# Patient Record
Sex: FEMALE | Race: BLACK OR AFRICAN AMERICAN | Employment: FULL TIME | URBAN - METROPOLITAN AREA
[De-identification: names, ages, dates, MRNs, and addresses within clinical notes are randomized per-mention and may not be internally consistent; named-entity substitution may affect disease eponyms.]

---

## 2017-02-11 ENCOUNTER — HOSPITAL ENCOUNTER (EMERGENCY)
Age: 32
Discharge: HOME | End: 2017-02-11
Attending: EMERGENCY MEDICINE
Payer: COMMERCIAL

## 2017-02-11 ENCOUNTER — APPOINTMENT (OUTPATIENT)
Dept: GENERAL RADIOLOGY | Age: 32
End: 2017-02-11
Attending: PHYSICIAN ASSISTANT
Payer: COMMERCIAL

## 2017-02-11 VITALS
BODY MASS INDEX: 47.18 KG/M2 | TEMPERATURE: 98.6 F | OXYGEN SATURATION: 100 % | DIASTOLIC BLOOD PRESSURE: 84 MMHG | HEART RATE: 87 BPM | SYSTOLIC BLOOD PRESSURE: 167 MMHG | WEIGHT: 283.51 LBS | RESPIRATION RATE: 18 BRPM
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
## 2017-02-11 DIAGNOSIS — M54.2 NECK PAIN: ICD-10-CM

## 2017-02-11 PROCEDURE — 72050 X-RAY EXAM NECK SPINE 4/5VWS: CPT

## 2017-02-11 PROCEDURE — 99282 EMERGENCY DEPT VISIT SF MDM: CPT

## 2017-02-11 PROCEDURE — 74011250637 HC RX REV CODE- 250/637: Performed by: PHYSICIAN ASSISTANT

## 2017-02-11 RX ORDER — DIAZEPAM 5 MG/1
5 TABLET ORAL
Status: COMPLETED | OUTPATIENT
Start: 2017-02-11 | End: 2017-02-11

## 2017-02-11 RX ORDER — OXYCODONE AND ACETAMINOPHEN 5; 325 MG/1; MG/1
1 TABLET ORAL
Status: COMPLETED | OUTPATIENT
Start: 2017-02-11 | End: 2017-02-11

## 2017-02-11 RX ORDER — OXYCODONE AND ACETAMINOPHEN 5; 325 MG/1; MG/1
1 TABLET ORAL
Qty: 10 TAB | Refills: 0 | Status: SHIPPED | OUTPATIENT
Start: 2017-02-11 | End: 2017-06-26

## 2017-02-11 RX ORDER — DIAZEPAM 5 MG/1
5 TABLET ORAL
Qty: 10 TAB | Refills: 0 | Status: SHIPPED | OUTPATIENT
Start: 2017-02-11 | End: 2017-06-26

## 2017-02-11 RX ADMIN — DIAZEPAM 5 MG: 5 TABLET ORAL at 13:19

## 2017-02-11 RX ADMIN — OXYCODONE HYDROCHLORIDE AND ACETAMINOPHEN 1 TABLET: 5; 325 TABLET ORAL at 13:18

## 2017-02-11 NOTE — DISCHARGE INSTRUCTIONS
Neck Pain: Care Instructions  Your Care Instructions  You can have neck pain anywhere from the bottom of your head to the top of your shoulders. It can spread to the upper back or arms. Injuries, painting a ceiling, sleeping with your neck twisted, staying in one position for too long, and many other activities can cause neck pain. Most neck pain gets better with home care. Your doctor may recommend medicine to relieve pain or relax your muscles. He or she may suggest exercise and physical therapy to increase flexibility and relieve stress. You may need to wear a special (cervical) collar to support your neck for a day or two. Follow-up care is a key part of your treatment and safety. Be sure to make and go to all appointments, and call your doctor if you are having problems. It's also a good idea to know your test results and keep a list of the medicines you take. How can you care for yourself at home? · Try using a heating pad on a low or medium setting for 15 to 20 minutes every 2 or 3 hours. Try a warm shower in place of one session with the heating pad. · You can also try an ice pack for 10 to 15 minutes every 2 to 3 hours. Put a thin cloth between the ice and your skin. · Take pain medicines exactly as directed. ¨ If the doctor gave you a prescription medicine for pain, take it as prescribed. ¨ If you are not taking a prescription pain medicine, ask your doctor if you can take an over-the-counter medicine. · If your doctor recommends a cervical collar, wear it exactly as directed. When should you call for help? Call your doctor now or seek immediate medical care if:  · You have new or worsening numbness in your arms, buttocks or legs. · You have new or worsening weakness in your arms or legs. (This could make it hard to stand up.)  · You lose control of your bladder or bowels.   Watch closely for changes in your health, and be sure to contact your doctor if:  · Your neck pain is getting worse.  · You are not getting better after 1 week. · You do not get better as expected. Where can you learn more? Go to http://angela-jennie.info/. Enter [id number] in the search box to learn more about \"Neck Pain: Care Instructions. \"  Current as of: May 23, 2016  Content Version: 11.1  © 2052-7727 "[employer] Care instructions adapted under license by [employer] (which disclaims liability or warranty for this information). If you have questions about a medical condition or this instruction, always ask your healthcare professional. [place] [address] any warranty or liability for your use of this information.
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
## 2017-02-20 ENCOUNTER — TELEPHONE (OUTPATIENT)
Dept: INTERNAL MEDICINE CLINIC | Age: 32
End: 2017-02-20
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
## 2017-02-20 NOTE — TELEPHONE ENCOUNTER
Called, spoke to pt. Two pt identifiers confirmed. Pt offered and accepted appt for 2/21/17 1015. Pt verbalized understanding of information discussed w/ no further questions at this time.

## 2017-02-21 ENCOUNTER — OFFICE VISIT (OUTPATIENT)
Dept: INTERNAL MEDICINE CLINIC | Age: 32
End: 2017-02-21

## 2017-02-21 VITALS
SYSTOLIC BLOOD PRESSURE: 127 MMHG | HEIGHT: 65 IN | TEMPERATURE: 98.1 F | WEIGHT: 277.4 LBS | OXYGEN SATURATION: 100 % | BODY MASS INDEX: 46.22 KG/M2 | HEART RATE: 72 BPM | DIASTOLIC BLOOD PRESSURE: 80 MMHG

## 2017-02-21 DIAGNOSIS — E55.9 VITAMIN D DEFICIENCY: ICD-10-CM
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
## 2017-02-21 DIAGNOSIS — G89.21 CHRONIC PAIN DUE TO TRAUMA: ICD-10-CM

## 2017-02-21 DIAGNOSIS — E66.01 MORBID OBESITY WITH BMI OF 45.0-49.9, ADULT (HCC): Chronic | ICD-10-CM

## 2017-02-21 DIAGNOSIS — G89.29 CHRONIC BILATERAL THORACIC BACK PAIN: Primary | ICD-10-CM

## 2017-02-21 DIAGNOSIS — M54.6 PAIN IN THORACIC SPINE: Primary | ICD-10-CM

## 2017-02-21 DIAGNOSIS — R73.03 PREDIABETES: ICD-10-CM

## 2017-02-21 DIAGNOSIS — M54.6 CHRONIC BILATERAL THORACIC BACK PAIN: Primary | ICD-10-CM
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
## 2017-02-21 RX ORDER — CYCLOBENZAPRINE HCL 10 MG
10 TABLET ORAL
Qty: 30 TAB | Refills: 0 | Status: SHIPPED | OUTPATIENT
Start: 2017-02-21 | End: 2017-06-26

## 2017-02-21 RX ORDER — IBUPROFEN 800 MG/1
800 TABLET ORAL
Qty: 40 TAB | Refills: 0 | Status: SHIPPED | OUTPATIENT
Start: 2017-02-21
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
## 2017-02-22 ENCOUNTER — TELEPHONE (OUTPATIENT)
Dept: INTERNAL MEDICINE CLINIC | Age: 32
End: 2017-02-22

## 2017-02-22 LAB
ALBUMIN SERPL-MCNC: 4.6 G/DL (ref 3.5–5.5)
ALBUMIN/GLOB SERPL: 2.1 {RATIO} (ref 1.1–2.5)
ALP SERPL-CCNC: 69 IU/L (ref 39–117)
ALT SERPL-CCNC: 12 IU/L (ref 0–32)
AST SERPL-CCNC: 16 IU/L (ref 0–40)
BILIRUB SERPL-MCNC: 0.5 MG/DL (ref 0–1.2)
BUN SERPL-MCNC: 13 MG/DL (ref 6–20)
BUN/CREAT SERPL: 20 (ref 8–20)
CALCIUM SERPL-MCNC: 9.3 MG/DL (ref 8.7–10.2)
CHLORIDE SERPL-SCNC: 100 MMOL/L (ref 96–106)
CO2 SERPL-SCNC: 22 MMOL/L (ref 18–29)
CREAT SERPL-MCNC: 0.65 MG/DL (ref 0.57–1)
ERYTHROCYTE [DISTWIDTH] IN BLOOD BY AUTOMATED COUNT: 14.7 % (ref 12.3–15.4)
EST. AVERAGE GLUCOSE BLD GHB EST-MCNC: 123 MG/DL
GLOBULIN SER CALC-MCNC: 2.2 G/DL (ref 1.5–4.5)
GLUCOSE SERPL-MCNC: 93 MG/DL (ref 65–99)
HBA1C MFR BLD: 5.9 % (ref 4.8–5.6)
HCT VFR BLD AUTO: 39.6 % (ref 34–46.6)
HGB BLD-MCNC: 12.6 G/DL (ref 11.1–15.9)
MCH RBC QN AUTO: 28.1 PG (ref 26.6–33)
MCHC RBC AUTO-ENTMCNC: 31.8 G/DL (ref 31.5–35.7)
MCV RBC AUTO: 88 FL (ref 79–97)
PLATELET # BLD AUTO: 411 X10E3/UL (ref 150–379)
POTASSIUM SERPL-SCNC: 4.6 MMOL/L (ref 3.5–5.2)
PROT SERPL-MCNC: 6.8 G/DL (ref 6–8.5)
RBC # BLD AUTO: 4.49 X10E6/UL (ref 3.77–5.28)
SODIUM SERPL-SCNC: 138 MMOL/L (ref 134–144)
TSH SERPL DL<=0.005 MIU/L-ACNC: 0.9 UIU/ML (ref 0.45–4.5)
WBC # BLD AUTO: 9.6 X10E3/UL (ref 3.4–10.8)

## 2017-02-22 NOTE — TELEPHONE ENCOUNTER
Pt called and states that she is needing a call back in regards to if she is to get a medication over the counter or called in for her Vitamin D level being low. Please call pt to advise.

## 2017-02-23 NOTE — TELEPHONE ENCOUNTER
MD Jake Root LPN        Caller: Unspecified (Yesterday, 12:49 PM)                     Start with vitamin d 2000units per day

## 2017-02-23 NOTE — TELEPHONE ENCOUNTER
Called, spoke to pt. Two pt identifiers confirmed. Pt informed per Dr. Petty Gaming to start with otc vitamin d 2kU daily. Pt verbalized understanding of information discussed w/ no further questions at this time.

## 2017-02-27 ENCOUNTER — HOSPITAL ENCOUNTER (OUTPATIENT)
Dept: PHYSICAL THERAPY | Age: 32
Discharge: HOME | End: 2017-02-27
Payer: COMMERCIAL

## 2017-02-27 PROCEDURE — 97535 SELF CARE MNGMENT TRAINING: CPT | Performed by: PHYSICAL THERAPIST

## 2017-02-27 PROCEDURE — 97162 PT EVAL MOD COMPLEX 30 MIN: CPT | Performed by: PHYSICAL THERAPIST

## 2017-02-27 PROCEDURE — 97110 THERAPEUTIC EXERCISES: CPT | Performed by: PHYSICAL THERAPIST
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
## 2017-03-01 ENCOUNTER — APPOINTMENT (OUTPATIENT)
Dept: PHYSICAL THERAPY | Age: 32
End: 2017-03-01
Payer: COMMERCIAL

## 2017-03-06 ENCOUNTER — HOSPITAL ENCOUNTER (OUTPATIENT)
Dept: PHYSICAL THERAPY | Age: 32
Discharge: HOME | End: 2017-03-06
Payer: COMMERCIAL

## 2017-03-06 PROCEDURE — 97110 THERAPEUTIC EXERCISES: CPT | Performed by: PHYSICAL THERAPIST

## 2017-03-06 NOTE — PROGRESS NOTES
PT DAILY TREATMENT NOTE 2-15    Patient Name: Laith Wakefield  Date:3/6/2017  : 1985  [x]  Patient  Verified  Payor: BLUE CROSS / Plan: [address] [place] [address] / Product Type: PPO /    In time:11:13am  Out time:11:56am  Total Treatment Time (min): 43  Visit #: 2     Treatment Area: Pain in thoracic spine [M54.6]  Other chronic pain [G89.29]    SUBJECTIVE  Pain Level (0-10 scale): 5  Any medication changes, allergies to medications, adverse drug reactions, diagnosis change, or new procedure performed?: [x] No    [] Yes (see summary sheet for update)  Subjective functional status/changes:   [] No changes reported  The patient reports that she's doing okay, she can't sleep without the flexeril. OBJECTIVE    40 min Therapeutic Exercise:  [x] See flow sheet :   Rationale: increase ROM, increase strength and improve coordination to improve the patients ability to perform daily activities. With   [x] TE   [] TA   [] neuro   [] other: Patient Education: [x] Review HEP    [x] Progressed/Changed HEP based on:   [x] positioning   [x] body mechanics   [] transfers   [] heat/ice application    [] other:      Other Objective/Functional Measures: Pt. Had minimal increase in discomfort with therex today. Pain Level (0-10 scale) post treatment: 6.5    ASSESSMENT/Changes in Function:     The patient progressed with tolerance to therex and ROM today. Patient will continue to benefit from skilled PT services to modify and progress therapeutic interventions, address functional mobility deficits, address ROM deficits, address strength deficits, analyze and address soft tissue restrictions, analyze and cue movement patterns, analyze and modify body mechanics/ergonomics, assess and modify postural abnormalities, address imbalance/dizziness and instruct in home and community integration to attain remaining goals.      [x]  See Plan of Care  []  See progress note/recertification  []  See Discharge Summary Progress towards goals / Updated goals: The patient is progressing towards goals.     PLAN  [x]  Upgrade activities as tolerated     [x]  Continue plan of care  [x]  Update interventions per flow sheet       []  Discharge due to:_  []  Other:_      Mario Bautista PT 3/6/2017  11:24 AM

## 2017-03-08 ENCOUNTER — APPOINTMENT (OUTPATIENT)
Dept: PHYSICAL THERAPY | Age: 32
End: 2017-03-08
Payer: COMMERCIAL

## 2017-03-09 ENCOUNTER — APPOINTMENT (OUTPATIENT)
Dept: PHYSICAL THERAPY | Age: 32
End: 2017-03-09
Payer: COMMERCIAL

## 2017-03-10 ENCOUNTER — HOSPITAL ENCOUNTER (OUTPATIENT)
Dept: PHYSICAL THERAPY | Age: 32
Discharge: HOME | End: 2017-03-10
Payer: COMMERCIAL

## 2017-03-10 PROCEDURE — 97110 THERAPEUTIC EXERCISES: CPT | Performed by: PHYSICAL THERAPIST

## 2017-03-10 PROCEDURE — 97014 ELECTRIC STIMULATION THERAPY: CPT | Performed by: PHYSICAL THERAPIST

## 2017-03-10 NOTE — PROGRESS NOTES
PT DAILY TREATMENT NOTE 2-15    Patient Name: Paola Valentine  Date:3/10/2017  : 1985  [x]  Patient  Verified  Payor: [employer] / Plan: [address] [place] [address] / Product Type: PPO /    In time:8:09am  Out time:9:28am  Total Treatment Time (min): 79  Visit #: 3     Treatment Area: Pain in thoracic spine [M54.6]  Other chronic pain [G89.29]    SUBJECTIVE  Pain Level (0-10 scale): 8  Any medication changes, allergies to medications, adverse drug reactions, diagnosis change, or new procedure performed?: [x] No    [] Yes (see summary sheet for update)  Subjective functional status/changes:   [] No changes reported  The patient reports that she couldn't sleep last night due to back pain and is hurting this morning. OBJECTIVE    Modality rationale: decrease edema, decrease inflammation, decrease pain and increase tissue extensibility to improve the patients ability to perform daily activities.    Min Type Additional Details   15 [x] Estim: []Att   [x]Unatt        []TENS instruct                  [x]IFC  []Premod   []NMES                     []Other:  []w/US   []w/ice   [x]w/heat  Position: supine  Location: mid thoracic    []  Traction: [] Cervical       []Lumbar                       [] Prone          []Supine                       []Intermittent   []Continuous Lbs:  [] before manual  [] after manual  []w/heat    []  Ultrasound: []Continuous   [] Pulsed at:                            []1MHz   []3MHz Location:  W/cm2:    []  Paraffin         Location:  []w/heat    []  Ice     []  Heat  []  Ice massage Position:  Location:    []  Laser  []  Other: Position:  Location:    []  Vasopneumatic Device Pressure:       [] lo [] med [] hi   Temperature:    [x] Skin assessment post-treatment:  [x]intact []redness- no adverse reaction    []redness  adverse reaction:     55 min Therapeutic Exercise:  [x] See flow sheet :   Rationale: increase ROM, increase strength and improve coordination to improve the patients ability to perform daily activities. With   [x] TE   [] TA   [] neuro   [] other: Patient Education: [x] Review HEP    [x] Progressed/Changed HEP based on:   [x] positioning   [x] body mechanics   [] transfers   [] heat/ice application    [] other:      Other Objective/Functional Measures: Pt. Tolerated therex well     Pain Level (0-10 scale) post treatment: 6    ASSESSMENT/Changes in Function:     The patient progressed with strengthening exercises to tolerance today. Patient will continue to benefit from skilled PT services to modify and progress therapeutic interventions, address functional mobility deficits, address ROM deficits, address strength deficits, analyze and address soft tissue restrictions, analyze and cue movement patterns, analyze and modify body mechanics/ergonomics, assess and modify postural abnormalities, address imbalance/dizziness and instruct in home and community integration to attain remaining goals. [x]  See Plan of Care  []  See progress note/recertification  []  See Discharge Summary         Progress towards goals / Updated goals: The patient is progressing towards goals.     PLAN  [x]  Upgrade activities as tolerated     [x]  Continue plan of care  [x]  Update interventions per flow sheet       []  Discharge due to:_  []  Other:_      Don Menezes, PT 3/10/2017  8:26 AM

## 2017-03-13 ENCOUNTER — HOSPITAL ENCOUNTER (OUTPATIENT)
Dept: PHYSICAL THERAPY | Age: 32
Discharge: HOME | End: 2017-03-13
Payer: COMMERCIAL

## 2017-03-13 PROCEDURE — 97014 ELECTRIC STIMULATION THERAPY: CPT | Performed by: PHYSICAL THERAPIST

## 2017-03-13 PROCEDURE — 97110 THERAPEUTIC EXERCISES: CPT | Performed by: PHYSICAL THERAPIST

## 2017-03-13 NOTE — PROGRESS NOTES
PT DAILY TREATMENT NOTE 2-15    Patient Name: Lilia Harper  Date:3/13/2017  : 1985  [x]  Patient  Verified  Payor: [employer] / Plan: [address] [place] [address] / Product Type: PPO /    In time:10:10am  Out time:10:55am  Total Treatment Time (min): 45  Visit #: 4     Treatment Area: Pain in thoracic spine [M54.6]  Other chronic pain [G89.29]    SUBJECTIVE  Pain Level (0-10 scale): 6  Any medication changes, allergies to medications, adverse drug reactions, diagnosis change, or new procedure performed?: [x] No    [] Yes (see summary sheet for update)  Subjective functional status/changes:   [] No changes reported  The patient reports that she felt much better after last time and thinks the ESTIM helped along with the new exercises. OBJECTIVE    Modality rationale: decrease edema, decrease inflammation, decrease pain and increase tissue extensibility to improve the patients ability to perform daily activities.    Min Type Additional Details   15 [x] Estim: []Att   [x]Unatt        []TENS instruct                  [x]IFC  []Premod   []NMES                     []Other:  []w/US   []w/ice   [x]w/heat  Position: supine  Location:  Mid thoracic    []  Traction: [] Cervical       []Lumbar                       [] Prone          []Supine                       []Intermittent   []Continuous Lbs:  [] before manual  [] after manual  []w/heat    []  Ultrasound: []Continuous   [] Pulsed at:                            []1MHz   []3MHz Location:  W/cm2:    []  Paraffin         Location:  []w/heat    []  Ice     []  Heat  []  Ice massage Position:  Location:    []  Laser  []  Other: Position:  Location:    []  Vasopneumatic Device Pressure:       [] lo [] med [] hi   Temperature:    [x] Skin assessment post-treatment:  [x]intact []redness- no adverse reaction    []redness  adverse reaction:     30 min Therapeutic Exercise:  [x] See flow sheet :   Rationale: increase ROM, increase strength and improve coordination to improve the patients ability to perform daily activities. With   [x] TE   [] TA   [] neuro   [] other: Patient Education: [x] Review HEP    [x] Progressed/Changed HEP based on:   [x] positioning   [x] body mechanics   [] transfers   [] heat/ice application    [] other:      Other Objective/Functional Measures: Pt. Tolerated new therex well     Pain Level (0-10 scale) post treatment: 4    ASSESSMENT/Changes in Function:     The patient progressed with new thoracic and core strengthening exercises. Patient will continue to benefit from skilled PT services to modify and progress therapeutic interventions, address functional mobility deficits, address ROM deficits, address strength deficits, analyze and address soft tissue restrictions, analyze and cue movement patterns, analyze and modify body mechanics/ergonomics, assess and modify postural abnormalities and instruct in home and community integration to attain remaining goals. [x]  See Plan of Care  []  See progress note/recertification  []  See Discharge Summary         Progress towards goals / Updated goals:   The patient is progressing towards goals    PLAN  [x]  Upgrade activities as tolerated     [x]  Continue plan of care  [x]  Update interventions per flow sheet       []  Discharge due to:_  []  Other:_      Kristie Birmingham, PT 3/13/2017  10:16 AM

## 2017-03-15 ENCOUNTER — APPOINTMENT (OUTPATIENT)
Dept: PHYSICAL THERAPY | Age: 32
End: 2017-03-15
Payer: COMMERCIAL

## 2017-03-16 ENCOUNTER — HOSPITAL ENCOUNTER (OUTPATIENT)
Dept: PHYSICAL THERAPY | Age: 32
Discharge: HOME | End: 2017-03-16
Payer: COMMERCIAL

## 2017-03-16 PROCEDURE — 97110 THERAPEUTIC EXERCISES: CPT | Performed by: PHYSICAL THERAPIST

## 2017-03-16 PROCEDURE — 97014 ELECTRIC STIMULATION THERAPY: CPT | Performed by: PHYSICAL THERAPIST

## 2017-03-16 NOTE — PROGRESS NOTES
PT DAILY TREATMENT NOTE 2-15    Patient Name: Laith Wakefield  Date:3/16/2017  : 1985  [x]  Patient  Verified  Payor: BLUE CROSS / Plan: [address] [place] [address] / Product Type: PPO /    In time:12:55pm  Out time:1:45pm  Total Treatment Time (min): 50  Visit #: 5     Treatment Area: Pain in thoracic spine [M54.6]  Other chronic pain [G89.29]    SUBJECTIVE  Pain Level (0-10 scale): 4  Any medication changes, allergies to medications, adverse drug reactions, diagnosis change, or new procedure performed?: [x] No    [] Yes (see summary sheet for update)  Subjective functional status/changes:   [] No changes reported  The patient reports that she thinks the exercises are helping, her pain is now a little higher towards the neck than middle back. OBJECTIVE    Modality rationale: decrease edema, decrease inflammation, decrease pain and increase tissue extensibility to improve the patients ability to perform daily activities.    Min Type Additional Details   15 [x] Estim: []Att   [x]Unatt        []TENS instruct                  [x]IFC  []Premod   []NMES                     []Other:  []w/US   []w/ice   [x]w/heat  Position: supine  Location: cervicothoracic    []  Traction: [] Cervical       []Lumbar                       [] Prone          []Supine                       []Intermittent   []Continuous Lbs:  [] before manual  [] after manual  []w/heat    []  Ultrasound: []Continuous   [] Pulsed at:                            []1MHz   []3MHz Location:  W/cm2:    []  Paraffin         Location:  []w/heat    []  Ice     []  Heat  []  Ice massage Position:  Location:    []  Laser  []  Other: Position:  Location:    []  Vasopneumatic Device Pressure:       [] lo [] med [] hi   Temperature:    [x] Skin assessment post-treatment:  [x]intact []redness- no adverse reaction    []redness  adverse reaction:     30 min Therapeutic Exercise:  [x] See flow sheet :   Rationale: increase ROM, increase strength and improve coordination to improve the patients ability to perform daily activities. With   [x] TE   [] TA   [] neuro   [] other: Patient Education: [x] Review HEP    [x] Progressed/Changed HEP based on:   [x] positioning   [x] body mechanics   [] transfers   [] heat/ice application    [] other:      Other Objective/Functional Measures: Pt. Had minimal to no increase in pain today. Pain Level (0-10 scale) post treatment: 4    ASSESSMENT/Changes in Function:     The patient progressed with tolerance to exercises and improved strength. Patient will continue to benefit from skilled PT services to modify and progress therapeutic interventions, address functional mobility deficits, address ROM deficits, address strength deficits, analyze and address soft tissue restrictions, analyze and cue movement patterns, analyze and modify body mechanics/ergonomics, assess and modify postural abnormalities, address imbalance/dizziness and instruct in home and community integration to attain remaining goals. [x]  See Plan of Care  []  See progress note/recertification  []  See Discharge Summary         Progress towards goals / Updated goals: The patient is progressing towards goals.     PLAN  [x]  Upgrade activities as tolerated     [x]  Continue plan of care  [x]  Update interventions per flow sheet       []  Discharge due to:_  []  Other:_      Darrick Negro, PT 3/16/2017  1:04 PM

## 2017-03-20 ENCOUNTER — APPOINTMENT (OUTPATIENT)
Dept: PHYSICAL THERAPY | Age: 32
End: 2017-03-20
Payer: COMMERCIAL

## 2017-03-21 ENCOUNTER — HOSPITAL ENCOUNTER (OUTPATIENT)
Dept: PHYSICAL THERAPY | Age: 32
Discharge: HOME | End: 2017-03-21
Payer: COMMERCIAL

## 2017-03-21 PROCEDURE — 97014 ELECTRIC STIMULATION THERAPY: CPT | Performed by: PHYSICAL THERAPIST

## 2017-03-21 PROCEDURE — 97110 THERAPEUTIC EXERCISES: CPT | Performed by: PHYSICAL THERAPIST

## 2017-03-21 NOTE — PROGRESS NOTES
PT DAILY TREATMENT NOTE 2-15    Patient Name: Katie Greene  Date:3/21/2017  : 1985  [x]  Patient  Verified  Payor: [employer] / Plan: [address] [place] [address] / Product Type: PPO /    In time:7:17am  Out time:8:20am  Total Treatment Time (min): 63  Visit #: 6    Treatment Area: Pain in thoracic spine [M54.6]  Other chronic pain [G89.29]    SUBJECTIVE  Pain Level (0-10 scale): 7  Any medication changes, allergies to medications, adverse drug reactions, diagnosis change, or new procedure performed?: [x] No    [] Yes (see summary sheet for update)  Subjective functional status/changes:   [] No changes reported  Ayush stated that her neck and back were bothering her last night and had trouble getting comfortable enough to fall asleep with several periods of waking up in the middle of the night.     OBJECTIVE    Modality rationale: decrease inflammation, decrease pain and increase tissue extensibility to improve the patients ability to perform exercises for therapy today   Min Type Additional Details   15 [x] Estim: []Att   [x]Unatt        []TENS instruct                  [x]IFC  []Premod   []NMES                     []Other:  []w/US   []w/ice   [x]w/heat  Position: supine  Location: upper thoracic spine and lower cervical region    []  Traction: [] Cervical       []Lumbar                       [] Prone          []Supine                       []Intermittent   []Continuous Lbs:  [] before manual  [] after manual  []w/heat    []  Ultrasound: []Continuous   [] Pulsed at:                            []1MHz   []3MHz Location:  W/cm2:    []  Paraffin         Location:  []w/heat    []  Ice     []  Heat  []  Ice massage Position:  Location:    []  Laser  []  Other: Position:  Location:    []  Vasopneumatic Device Pressure:       [] lo [] med [] hi   Temperature:    [x] Skin assessment post-treatment:  [x]intact []redness- no adverse reaction    []redness  adverse reaction:     40 min Therapeutic Exercise:  [x] See flow sheet :   Rationale: increase ROM, increase strength and improve coordination to improve the patients ability to perform daily activities. With   [x] TE   [] TA   [] neuro   [] other: Patient Education: [x] Review HEP    [x] Progressed/Changed HEP based on:   [x] positioning   [x] body mechanics   [] transfers   [] heat/ice application    [] other:          Pain Level (0-10 scale) post treatment: 5    ASSESSMENT/Changes in Function:   Patient tolerated more strengthening and range of motion activity today, she continues to complain of upper thoracic spine pain and difficulty with right upper trunk rotation exercise compared to the left. Patient will continue to benefit from skilled PT services to modify and progress therapeutic interventions, address functional mobility deficits, address ROM deficits, address strength deficits, analyze and address soft tissue restrictions, analyze and cue movement patterns, analyze and modify body mechanics/ergonomics, assess and modify postural abnormalities and instruct in home and community integration to attain remaining goals. [x]  See Plan of Care  []  See progress note/recertification  []  See Discharge Summary         Progress towards goals / Updated goals: The patient is progressing towards goals.     PLAN  [x]  Upgrade activities as tolerated     [x]  Continue plan of care  [x]  Update interventions per flow sheet       []  Discharge due to:_  []  Other:_      Nola Sharif PT 3/21/2017  7:28 AM

## 2017-03-22 ENCOUNTER — APPOINTMENT (OUTPATIENT)
Dept: PHYSICAL THERAPY | Age: 32
End: 2017-03-22
Payer: COMMERCIAL

## 2017-03-24 ENCOUNTER — HOSPITAL ENCOUNTER (OUTPATIENT)
Dept: PHYSICAL THERAPY | Age: 32
Discharge: HOME | End: 2017-03-24
Payer: COMMERCIAL

## 2017-03-24 PROCEDURE — 97014 ELECTRIC STIMULATION THERAPY: CPT | Performed by: PHYSICAL THERAPIST

## 2017-03-24 PROCEDURE — 97110 THERAPEUTIC EXERCISES: CPT | Performed by: PHYSICAL THERAPIST

## 2017-03-27 ENCOUNTER — APPOINTMENT (OUTPATIENT)
Dept: PHYSICAL THERAPY | Age: 32
End: 2017-03-27
Payer: COMMERCIAL

## 2017-03-29 ENCOUNTER — HOSPITAL ENCOUNTER (OUTPATIENT)
Dept: PHYSICAL THERAPY | Age: 32
Discharge: HOME | End: 2017-03-29
Payer: COMMERCIAL

## 2017-03-29 PROCEDURE — 97110 THERAPEUTIC EXERCISES: CPT | Performed by: PHYSICAL THERAPIST

## 2017-03-29 NOTE — PROGRESS NOTES
PT DAILY TREATMENT NOTE 2-15    Patient Name: Pramod Sep  Date:3/29/2017  : 1985  [x]  Patient  Verified  Payor: HARRISON KAYLEIGH / Plan: [address] [place] [address] / Product Type: PPO /    In time:12:05pm  Out time: 12:55pm  Total Treatment Time (min): 50  Visit #: 8     Treatment Area: Pain in thoracic spine [M54.6]  Other chronic pain [G89.29]    SUBJECTIVE  Pain Level (0-10 scale): 4  Any medication changes, allergies to medications, adverse drug reactions, diagnosis change, or new procedure performed?: [x] No    [] Yes (see summary sheet for update)  Subjective functional status/changes:   [] No changes reported  PAtient stated she's been feeling good today and since last session. OBJECTIVE      50 min Therapeutic Exercise:  [x] See alow sheet :   Rationale: increase ROM to improve the patients ability to improve ability to perform overhead work and household tasks. With   [x] TE   [] TA   [] neuro   [] other: Patient Education: [x] Review HEP    [x] Progressed/Changed HEP based on:   [x] positioning   [x] body mechanics   [] transfers   [] heat/ice application    [] other:      Pain Level (0-10 scale) post treatment: 5    ASSESSMENT/Changes in Function:   Patient progressing with more resistance and decreased pain during exercise today. She opted out of using the e-stim modality because she was less irritated at the end of the session. She also showed improved lumbar and cervical mobility during cable column rotation exercises. She still has increased pain at end of session however.   Patient will continue to benefit from skilled PT services to modify and progress therapeutic interventions, address functional mobility deficits, address ROM deficits, address strength deficits, analyze and address soft tissue restrictions, analyze and cue movement patterns, analyze and modify body mechanics/ergonomics, assess and modify postural abnormalities and instruct in home and community integration to attain remaining goals.      [x]  See Plan of Care  [x]  See progress note/recertification  []  See Discharge Summary           PLAN  [x]  Upgrade activities as tolerated     [x]  Continue plan of care  [x]  Update interventions per flow sheet       []  Discharge due to:_  []  Other:_      Ave Padilla, PT 3/29/2017  12:37 PM

## 2017-03-31 ENCOUNTER — APPOINTMENT (OUTPATIENT)
Dept: PHYSICAL THERAPY | Age: 32
End: 2017-03-31
Payer: COMMERCIAL

## 2017-04-04 ENCOUNTER — APPOINTMENT (OUTPATIENT)
Dept: PHYSICAL THERAPY | Age: 32
End: 2017-04-04
Payer: COMMERCIAL

## 2017-04-06 ENCOUNTER — HOSPITAL ENCOUNTER (OUTPATIENT)
Dept: PHYSICAL THERAPY | Age: 32
Discharge: HOME | End: 2017-04-06
Payer: COMMERCIAL

## 2017-04-06 PROCEDURE — 97110 THERAPEUTIC EXERCISES: CPT | Performed by: PHYSICAL THERAPIST

## 2017-04-11 ENCOUNTER — HOSPITAL ENCOUNTER (OUTPATIENT)
Dept: PHYSICAL THERAPY | Age: 32
Discharge: HOME | End: 2017-04-11
Payer: COMMERCIAL

## 2017-04-11 PROCEDURE — 97110 THERAPEUTIC EXERCISES: CPT | Performed by: PHYSICAL THERAPIST

## 2017-04-11 NOTE — PROGRESS NOTES
PT DAILY TREATMENT NOTE 2-15    Patient Name: Pramod Sep  Date:2017  : 1985  [x]  Patient  Verified  Payor: BLUE CROSS / Plan: [address] [place] [address] / Product Type: PPO /    In time: 7:20am  Out time:8:10am  Total Treatment Time (min): 50  Visit #: 10     Treatment Area: Pain in thoracic spine [M54.6]  Other chronic pain [G89.29]    SUBJECTIVE  Pain Level (0-10 scale): 5  Any medication changes, allergies to medications, adverse drug reactions, diagnosis change, or new procedure performed?: [x] No    [] Yes (see summary sheet for update)  Subjective functional status/changes:   [] No changes reported  The patient reports that her grandmother passed yesterday so she hasn't been doing too well overall. Her upper back/low neck pain usually gets bad mid-day and worse as the day goes on, and she describes it as tingling. OBJECTIVE    50 min Therapeutic Exercise:  [x] See flow sheet :   Rationale: increase ROM, increase strength and improve coordination to improve the patients ability to perform daily activities. With   [x] TE   [] TA   [] neuro   [] other: Patient Education: [x] Review HEP    [x] Progressed/Changed HEP based on:   [x] positioning   [x] body mechanics   [] transfers   [] heat/ice application    [] other:      Other Objective/Functional Measures:  FOTO= 59 (47 at eval); Cervical AROM: Rotation: R/L= 55, p! Bilaterally (45, p! Bilaterally at eval); Ext= 50 (20, p! At eval); Flex= 40 (35, p! At eval)     Pain Level (0-10 scale) post treatment: 5    ASSESSMENT/Changes in Function:     The patient is progressing with improved ROM and tolerance to therex.  Patient will continue to benefit from skilled PT services to modify and progress therapeutic interventions, address functional mobility deficits, address ROM deficits, address strength deficits, analyze and address soft tissue restrictions, analyze and cue movement patterns, analyze and modify body mechanics/ergonomics, assess and modify postural abnormalities and instruct in home and community integration to attain remaining goals. [x]  See Plan of Care  [x]  See progress note/recertification  []  See Discharge Summary         Progress towards goals / Updated goals: The patient is progressing towards goals.     PLAN  [x]  Upgrade activities as tolerated     [x]  Continue plan of care  [x]  Update interventions per flow sheet       []  Discharge due to:_  []  Other:_      David Kc, PT 4/11/2017  7:20 AM

## 2017-04-11 NOTE — PROGRESS NOTES
[facility]  [address] ([facility] [address] [facility] [address], [address]  Phone: [phone] Fax: [phone]    Progress Note    Name: Miriam Krabbe   : 1985   MD: Salvador Alejandra MD       Treatment Diagnosis: Pain in thoracic spine [M54.6]  Other chronic pain [G89.29]  Start of Care: 16    Visits from Start of Care: 10  Missed Visits: 7    Summary of Care: Therapy has included cervical, thoracic, and scapular strengthening and mobility therex, along with modalities for pain management. Assessment / Recommendations: The patient has progressed significantly with improved Cervical AROM: Rotation: R/L= 55, p! Bilaterally (45, p! Bilaterally at eval); Ext= 50 (20, p! At eval); Flex= 40 (35, p! At eval). She originally had significant mid thoracic pain, which has resolved, but her main discomfort is lower cervical (bilaterally), that she describes as occasional tingling. She is very tight in her upper traps bilaterally and is tender to palpate medial scapular borders. Her pain continues to seem muscular versus discogenic in nature and will continue to benefit from therapy to improve tissue extensibility, cervical, and thoracic mobility. Short Term Goals: To be accomplished in 2 treatments:  1.) The patient will be independent with her HEP.- MET  Long Term Goals: To be accomplished in 16 treatments:  1.) The patient will be able to sleep an entire night without waking up from pain without meds. - Progressing  2.) The patient will improve her cervical AROM rotation to at least 50 degrees, pain free, to assist with driving and daily activities. - Progressing (55 degrees but with pain; improved from 45 degrees bilaterally)  3.) The patient will have decreased thoracic pain to at most 2/10 pain to tolerance daily activities such as driving.- MET     Other: Continue 2x/wk  for 8 more treatments      She Johnson, PT 2017 8:05 AM    ________________________________________________________________________  NOTE TO PHYSICIAN:  Please complete the following and fax to: [facility]: [phone]  . Retain this original for your records. If you are unable to process this request in 24 hours, please contact our office.        ____ I have read the above report and request that my patient continue therapy with the following changes/special instructions:  ____ I have read the above report and request that my patient be discharged from therapy    Physician's Signature:_________________ Date:___________Time:__________

## 2017-04-13 ENCOUNTER — APPOINTMENT (OUTPATIENT)
Dept: PHYSICAL THERAPY | Age: 32
End: 2017-04-13
Payer: COMMERCIAL

## 2017-04-20 ENCOUNTER — APPOINTMENT (OUTPATIENT)
Dept: PHYSICAL THERAPY | Age: 32
End: 2017-04-20
Payer: COMMERCIAL

## 2017-04-21 ENCOUNTER — APPOINTMENT (OUTPATIENT)
Dept: PHYSICAL THERAPY | Age: 32
End: 2017-04-21
Payer: COMMERCIAL

## 2017-04-25 ENCOUNTER — APPOINTMENT (OUTPATIENT)
Dept: PHYSICAL THERAPY | Age: 32
End: 2017-04-25
Payer: COMMERCIAL

## 2017-04-27 ENCOUNTER — HOSPITAL ENCOUNTER (OUTPATIENT)
Dept: PHYSICAL THERAPY | Age: 32
Discharge: HOME | End: 2017-04-27
Payer: COMMERCIAL

## 2017-04-27 ENCOUNTER — APPOINTMENT (OUTPATIENT)
Dept: PHYSICAL THERAPY | Age: 32
End: 2017-04-27
Payer: COMMERCIAL

## 2017-04-27 PROCEDURE — 97110 THERAPEUTIC EXERCISES: CPT | Performed by: PHYSICAL THERAPIST

## 2017-04-27 NOTE — PROGRESS NOTES
PT DAILY TREATMENT NOTE 2-15    Patient Name: Miriam Krabbe  Date:2017  : 1985  [x]  Patient  Verified  Payor: BLUE CROSS / Plan: [address] [place] [address] / Product Type: PPO /    In time:7:00am  Out time:7:59  Total Treatment Time (min): 61  Visit #: 11     Treatment Area: Pain in thoracic spine [M54.6]  Other chronic pain [G89.29]    SUBJECTIVE  Pain Level (0-10 scale): 5  Any medication changes, allergies to medications, adverse drug reactions, diagnosis change, or new procedure performed?: [x] No    [] Yes (see summary sheet for update)  Subjective functional status/changes:   [] No changes reported  The patient reports she had two family members pass away over the past few weeks and it's been stressful, but she's been concentrating on her posture and it's been better. OBJECTIVE    55 min Therapeutic Exercise:  [x] See flow sheet :   Rationale: increase ROM, increase strength and improve coordination to improve the patients ability to perform daily activities. With   [x] TE   [] TA   [] neuro   [] other: Patient Education: [x] Review HEP    [x] Progressed/Changed HEP based on:   [x] positioning   [x] body mechanics   [] transfers   [] heat/ice application    [] other:      Other Objective/Functional Measures: Pt. tolerated therex well. Pain Level (0-10 scale) post treatment: 6.5    ASSESSMENT/Changes in Function:     The patient is progressing with new mobilization and strengthening exercises. Patient will continue to benefit from skilled PT services to modify and progress therapeutic interventions, address functional mobility deficits, address ROM deficits, address strength deficits, analyze and address soft tissue restrictions, analyze and cue movement patterns, analyze and modify body mechanics/ergonomics, assess and modify postural abnormalities, address imbalance/dizziness and instruct in home and community integration to attain remaining goals.      [x]  See Plan of Care  []  See progress note/recertification  []  See Discharge Summary         Progress towards goals / Updated goals: The patient is progressing towards goals.     PLAN  [x]  Upgrade activities as tolerated     [x]  Continue plan of care  [x]  Update interventions per flow sheet       []  Discharge due to:_  []  Other:_      Miky Tee, PT 4/27/2017  7:13 AM

## 2017-05-02 ENCOUNTER — APPOINTMENT (OUTPATIENT)
Dept: PHYSICAL THERAPY | Age: 32
End: 2017-05-02
Payer: COMMERCIAL

## 2017-05-04 ENCOUNTER — HOSPITAL ENCOUNTER (OUTPATIENT)
Dept: PHYSICAL THERAPY | Age: 32
Discharge: HOME | End: 2017-05-04
Payer: COMMERCIAL

## 2017-05-04 PROCEDURE — 97110 THERAPEUTIC EXERCISES: CPT | Performed by: PHYSICAL THERAPIST

## 2017-05-04 NOTE — PROGRESS NOTES
PT DAILY TREATMENT NOTE 2-15    Patient Name: Miriam Krabbe  Date:2017  : 1985  [x]  Patient  Verified  Payor: HARRISON Artemas / Plan: [address] [place] [address] / Product Type: PPO /    In time:8:07am  Out time: 9:00am  Total Treatment Time (min): 53  Visit #: 12     Treatment Area: Pain in thoracic spine [M54.6]  Other chronic pain [G89.29]    SUBJECTIVE  Pain Level (0-10 scale): 5  Any medication changes, allergies to medications, adverse drug reactions, diagnosis change, or new procedure performed?: [x] No    [] Yes (see summary sheet for update)  Subjective functional status/changes:   [] No changes reported  The patient reports that she is tolerating driving more, her pain is more manageable, but is usually more stiff in the morning. OBJECTIVE    53 min Therapeutic Exercise:  [x] See flow sheet :   Rationale: increase ROM, increase strength and improve coordination to improve the patients ability to perform daily activities. With   [x] TE   [] TA   [] neuro   [] other: Patient Education: [x] Review HEP    [x] Progressed/Changed HEP based on:   [x] positioning   [x] body mechanics   [] transfers   [] heat/ice application    [] other:      Other Objective/Functional Measures: Pt. Had no increase in pain with therex     Pain Level (0-10 scale) post treatment: 5    ASSESSMENT/Changes in Function:     The patient progressed with improved strengthening and mobility today. Patient will continue to benefit from skilled PT services to modify and progress therapeutic interventions, address functional mobility deficits, address ROM deficits, address strength deficits, analyze and address soft tissue restrictions, analyze and cue movement patterns, analyze and modify body mechanics/ergonomics, assess and modify postural abnormalities, address imbalance/dizziness and instruct in home and community integration to attain remaining goals.      [x]  See Plan of Care  []  See progress note/recertification  []  See Discharge Summary         Progress towards goals / Updated goals: The patient is progressing towards goals.     PLAN  [x]  Upgrade activities as tolerated     [x]  Continue plan of care  [x]  Update interventions per flow sheet       []  Discharge due to:_  []  Other:_      Meagan Loaiza, PT 5/4/2017  8:14 AM

## 2017-05-09 ENCOUNTER — APPOINTMENT (OUTPATIENT)
Dept: PHYSICAL THERAPY | Age: 32
End: 2017-05-09
Payer: COMMERCIAL

## 2017-05-11 ENCOUNTER — APPOINTMENT (OUTPATIENT)
Dept: PHYSICAL THERAPY | Age: 32
End: 2017-05-11
Payer: COMMERCIAL

## 2017-05-16 ENCOUNTER — HOSPITAL ENCOUNTER (OUTPATIENT)
Dept: PHYSICAL THERAPY | Age: 32
Discharge: HOME | End: 2017-05-16
Payer: COMMERCIAL

## 2017-05-16 PROCEDURE — 97110 THERAPEUTIC EXERCISES: CPT | Performed by: PHYSICAL THERAPIST

## 2017-05-16 NOTE — PROGRESS NOTES
PT DAILY TREATMENT NOTE 2-15    Patient Name: Gui Caicedo  Date:2017  : 1985  [x]  Patient  Verified  Payor: BLUE CROSS / Plan: [address] [place] [address] / Product Type: PPO /    In time:9:43am  Out time:10:45am  Total Treatment Time (min): 58  Visit #: 13     Treatment Area: Pain in thoracic spine [M54.6]  Other chronic pain [G89.29]    SUBJECTIVE  Pain Level (0-10 scale): 5  Any medication changes, allergies to medications, adverse drug reactions, diagnosis change, or new procedure performed?: [x] No    [] Yes (see summary sheet for update)  Subjective functional status/changes:   [] No changes reported  The patient reports that she's still not feeling well (not able to keep food down) so she hasn't been doing many exercises but has been doing chin tucks, but still states that she is much better overall than when she first started. OBJECTIVE    55 min Therapeutic Exercise:  [x] See flow sheet :   Rationale: increase ROM, increase strength and improve coordination to improve the patients ability to perform daily activities. With   [x] TE   [] TA   [] neuro   [] other: Patient Education: [x] Review HEP    [x] Progressed/Changed HEP based on:   [x] positioning   [x] body mechanics   [] transfers   [] heat/ice application    [] other:      Other Objective/Functional Measures: Pt. Had no increase in pain with new therex     Pain Level (0-10 scale) post treatment: 5    ASSESSMENT/Changes in Function:     The patient was given a custom weekly exercise program sheet to be more consistent with her exercises and tolerated new cervical exercises today.  Patient will continue to benefit from skilled PT services to modify and progress therapeutic interventions, address functional mobility deficits, address ROM deficits, address strength deficits, analyze and address soft tissue restrictions, analyze and cue movement patterns, analyze and modify body mechanics/ergonomics, assess and modify postural abnormalities, address imbalance/dizziness and instruct in home and community integration to attain remaining goals. [x]  See Plan of Care  []  See progress note/recertification  []  See Discharge Summary         Progress towards goals / Updated goals: The patient is progressing towards goals.      PLAN  [x]  Upgrade activities as tolerated     [x]  Continue plan of care  [x]  Update interventions per flow sheet       []  Discharge due to:_  []  Other:_      Sunitha Fairfax, PT 5/16/2017  9:56 AM

## 2017-05-18 ENCOUNTER — HOSPITAL ENCOUNTER (OUTPATIENT)
Dept: PHYSICAL THERAPY | Age: 32
End: 2017-05-18
Payer: COMMERCIAL

## 2017-05-25 ENCOUNTER — HOSPITAL ENCOUNTER (OUTPATIENT)
Dept: PHYSICAL THERAPY | Age: 32
Discharge: HOME | End: 2017-05-25
Payer: COMMERCIAL

## 2017-05-25 PROCEDURE — 97110 THERAPEUTIC EXERCISES: CPT | Performed by: PHYSICAL THERAPIST

## 2017-05-25 NOTE — PROGRESS NOTES
PT DAILY TREATMENT NOTE 2-15    Patient Name: Jeanette Choi  Date:2017  : 1985  [x]  Patient  Verified  Payor: BLUE CROSS / Plan: [address] [place] [address] / Product Type: PPO /    In time: 8:02am  Out time: 9:00am  Total Treatment Time (min): 58  Visit #: 14     Treatment Area: Pain in thoracic spine [M54.6]  Other chronic pain [G89.29]    SUBJECTIVE  Pain Level (0-10 scale): 6  Any medication changes, allergies to medications, adverse drug reactions, diagnosis change, or new procedure performed?: [x] No    [] Yes (see summary sheet for update)  Subjective functional status/changes:   [] No changes reported  The patient reports that she's been doing her home exercise program.    OBJECTIVE    55 min Therapeutic Exercise:  [x] See flow sheet :   Rationale: increase ROM, increase strength and improve coordination to improve the patients ability to perform daily activities          With   [x] TE   [] TA   [] neuro   [] other: Patient Education: [x] Review HEP    [x] Progressed/Changed HEP based on:   [x] positioning   [x] body mechanics   [] transfers   [] heat/ice application    [] other:      Other Objective/Functional Measures: Pt. Tolerated therex well     Pain Level (0-10 scale) post treatment: 4    ASSESSMENT/Changes in Function:     The patient progressed with very dynamic thoracic, core, and cervical ROM and strengthening therex today with decreased pain after therapy. Patient will continue to benefit from skilled PT services to modify and progress therapeutic interventions, address functional mobility deficits, address ROM deficits, address strength deficits, analyze and address soft tissue restrictions, analyze and cue movement patterns, analyze and modify body mechanics/ergonomics, assess and modify postural abnormalities, address imbalance/dizziness and instruct in home and community integration to attain remaining goals.      [x]  See Plan of Care  []  See progress note/recertification  []  See Discharge Summary         Progress towards goals / Updated goals: The patient progressed towards goals.     PLAN  [x]  Upgrade activities as tolerated     [x]  Continue plan of care  [x]  Update interventions per flow sheet       []  Discharge due to:_  []  Other:_      Yisel Cedeno, PT 5/25/2017  11:47 AM

## 2017-06-09 ENCOUNTER — HOSPITAL ENCOUNTER (OUTPATIENT)
Dept: PHYSICAL THERAPY | Age: 32
Discharge: HOME | End: 2017-06-09
Payer: COMMERCIAL

## 2017-06-09 ENCOUNTER — APPOINTMENT (OUTPATIENT)
Dept: PHYSICAL THERAPY | Age: 32
End: 2017-06-09
Payer: COMMERCIAL

## 2017-06-09 PROCEDURE — 97110 THERAPEUTIC EXERCISES: CPT | Performed by: PHYSICAL THERAPIST

## 2017-06-09 NOTE — PROGRESS NOTES
PT DAILY TREATMENT NOTE 2-15    Patient Name: Marta Swenson  Date:2017  : 1985  [x]  Patient  Verified  Payor: HARRISON Atmore / Plan: [address] [place] [address] / Product Type: PPO /    In time:1:10pm  Out time: 1:55pm  Total Treatment Time (min): 45  Visit #: 15     Treatment Area: Pain in thoracic spine [M54.6]  Other chronic pain [G89.29]    SUBJECTIVE  Pain Level (0-10 scale): 4  Any medication changes, allergies to medications, adverse drug reactions, diagnosis change, or new procedure performed?: [x] No    [] Yes (see summary sheet for update)  Subjective functional status/changes:   [] No changes reported  The patient reports that she was in [place] for a week and didn't do her exercises, but concentrating on posture has helped a lot. OBJECTIVE    45 min Therapeutic Exercise:  [x] See flow sheet :   Rationale: increase ROM, increase strength and improve coordination to improve the patients ability to perform daily activities. With   [x] TE   [] TA   [] neuro   [] other: Patient Education: [x] Review HEP    [x] Progressed/Changed HEP based on:   [x] positioning   [x] body mechanics   [] transfers   [] heat/ice application    [] other:      Other Objective/Functional Measures: FOTO= 63 (47 at eval); Cervical AROM: rotation: R= 68 (45, p! At eval); L= 70 (45, p! At eval); Ext= 50 (20 at eval), Flex= 47 (35, p! At eval)     Pain Level (0-10 scale) post treatment: 4    ASSESSMENT/Changes in Function:     Patient has MET or is progressing towards goals and will be discharged today. []  See Plan of Care  []  See progress note/recertification  [x]  See Discharge Summary         Progress towards goals / Updated goals:  Pt. Has MET goals    PLAN  []  Upgrade activities as tolerated     []  Continue plan of care  []  Update interventions per flow sheet       [x]  Discharge due to: Pt. Has MET goals.   []  Other:_      [facility], PT 2017  1:22 PM

## 2017-06-09 NOTE — PROGRESS NOTES
[facility]  [facility] [address] [facility] [place]  Phone: [phone] Fax: [phone]    Discharge Summary  2-15    Patient name: Katherine Stark  : 1985  Provider#: [id number]  Referral source: Zachariah Capellan MD      Medical/Treatment Diagnosis: Pain in thoracic spine [M54.6]  Other chronic pain [G89.29]     Prior Hospitalization: see medical history     Comorbidities: See Plan of Care  Prior Level of Function:See Plan of Care  Medications: Verified on Patient Summary List    Start of Care: 17      Onset Date:2/10/17   Visits from Start of Care: 15     Missed Visits: 14  Reporting Period : 17 to 17      ASSESSMENT/SUMMARY OF CARE: The patient has progressed significantly with Cervical AROM: rotation: R= 68 (45, p! At eval); L= 70 (45, p! At eval); Ext= 50 (20 at eval), Flex= 47 (35, p! At eval). She continues to have some discomfort in her cervicothoracic area, but has progressed from as high as 8/10 pain to more consistently 4/10. She has a very advanced home exercise program that she will continue independently in order to maintain or further progress her mobility and discomfort. Short Term Goals: To be accomplished in 2 treatments:  1.) The patient will be independent with her HEP.- MET  Long Term Goals: To be accomplished in 16 treatments:  1.) The patient will be able to sleep an entire night without waking up from pain without meds. - MET  2.) The patient will improve her cervical AROM rotation to at least 50 degrees, pain free, to assist with driving and daily activities. - MET  3.) The patient will have decreased thoracic pain to at most 2/10 pain to tolerance daily activities such as driving.- MET         RECOMMENDATIONS:  [x]Discontinue therapy: [x]Patient has reached or is progressing toward set goals      []Patient is non-compliant or has abdicated      []Due to lack of appreciable progress towards set goals      []Other    Sylvia Chawla, PT 6/9/2017 2:14 PM

## 2017-06-26 ENCOUNTER — OFFICE VISIT (OUTPATIENT)
Dept: INTERNAL MEDICINE CLINIC | Age: 32
End: 2017-06-26

## 2017-06-26 VITALS
RESPIRATION RATE: 16 BRPM | OXYGEN SATURATION: 99 % | SYSTOLIC BLOOD PRESSURE: 121 MMHG | WEIGHT: 283 LBS | HEIGHT: 65 IN | TEMPERATURE: 98.3 F | HEART RATE: 85 BPM | DIASTOLIC BLOOD PRESSURE: 75 MMHG | BODY MASS INDEX: 47.15 KG/M2

## 2017-06-26 DIAGNOSIS — Z00.00 PHYSICAL EXAM, ANNUAL: Primary | ICD-10-CM

## 2017-06-26 RX ORDER — AMOXICILLIN AND CLAVULANATE POTASSIUM 875; 125 MG/1; MG/1
1 TABLET, FILM COATED ORAL 2 TIMES DAILY
Qty: 14 TAB | Refills: 0 | Status: SHIPPED | OUTPATIENT
Start: 2017-06-26 | End: 2017-07-03

## 2017-06-26 NOTE — MR AVS SNAPSHOT
Visit Information Date & Time Provider Department Dept. Phone Encounter #  
 6/26/2017 10:30 AM Helen Phillips, [address] [place] [address],[address] [phone] [id number] Follow-up Instructions Return in about 1 year (around 6/26/2018). Upcoming Health Maintenance Date Due INFLUENZA AGE 9 TO ADULT 8/1/2017 PAP AKA CERVICAL CYTOLOGY 3/14/2019 DTaP/Tdap/Td series (2 - Td) 5/6/2023 Allergies as of 6/26/2017  Review Complete On: 6/26/2017 By: Helen Phillips MD  
 No Known Allergies Current Immunizations  Reviewed on 2/21/2017 Name Date MMR 4/23/1996 Tdap 5/6/2013  9:30 AM  
  
 Not reviewed this visit You Were Diagnosed With   
  
 Codes Comments Physical exam, annual    -  Primary ICD-10-CM: Z00.00 ICD-9-CM: V70.0 Vitals BP Pulse Temp Resp Height(growth percentile) Weight(growth percentile) 121/75 (BP 1 Location: Left arm, BP Patient Position: Sitting) 85 98.3 °F (36.8 °C) (Oral) 16 5' 5\" (1.651 m) 283 lb (128.4 kg) SpO2 BMI OB Status Smoking Status 99% 47.09 kg/m2 Having regular periods Former Smoker BMI and BSA Data Body Mass Index Body Surface Area 47.09 kg/m 2 2.43 m 2 Preferred Pharmacy Pharmacy Name Phone [facility] [address] [place] [address] [address] [phone] Your Updated Medication List  
  
   
This list is accurate as of: 6/26/17 10:51 AM.  Always use your most recent med list.  
  
  
  
  
 amoxicillin-clavulanate 875-125 mg per tablet Commonly known as:  AUGMENTIN Take 1 Tab by mouth two (2) times a day for 7 days. ibuprofen 800 mg tablet Commonly known as:  MOTRIN Take 1 Tab by mouth every eight (8) hours as needed for Pain. PROAIR HFA 90 mcg/actuation inhaler Generic drug:  albuterol Take  by inhalation. Prescriptions Printed Refills amoxicillin-clavulanate (AUGMENTIN) 875-125 mg per tablet 0 Sig: Take 1 Tab by mouth two (2) times a day for 7 days. Class: Print Route: Oral  
  
We Performed the Following CBC W/O DIFF [11509 CPT(R)] HEMOGLOBIN A1C WITH EAG [62554 CPT(R)] LIPID PANEL [60239 CPT(R)] METABOLIC PANEL, COMPREHENSIVE [58122 CPT(R)] REFERRAL TO GASTROENTEROLOGY [XBT65 Custom] Comments:  
 Please evaluate patient for aunt with colon ca age 40 TSH 3RD GENERATION [71247 CPT(R)] Follow-up Instructions Return in about 1 year (around 6/26/2018). Referral Information Referral ID Referred By Referred To  
  
 [id number] [facility] [place] [address] [place], [address] [facility] Visits Status Start Date End Date 1 New Request 6/26/17 6/26/18 If your referral has a status of pending review or denied, additional information will be sent to support the outcome of this decision. Patient Instructions Zyrtec 10mg and flonase over the counter for post nasal drip. Schedule pelvic exam  
 
 
Labs today Introducing [facility]! [facility] introduces [employer] patient portal. Now you can access parts of your medical record, email your doctor's office, and request medication refills online. 1. In your internet browser, go to [email or web address] 2. Click on the First Time User? Click Here link in the Sign In box. You will see the New Member Sign Up page. 3. Enter your [employer] Access Code exactly as it appears below. You will not need to use this code after youve completed the sign-up process. If you do not sign up before the expiration date, you must request a new code. · [employer] Access Code: [email or web address] Expires: 9/16/2017  3:47 PM 
 
4.  Enter the last four digits of your Social Security Number (xxxx) and Date of Birth (mm/dd/yyyy) as indicated and click Submit. You will be taken to the next sign-up page. 5. Create a [employer] ID. This will be your [employer] login ID and cannot be changed, so think of one that is secure and easy to remember. 6. Create a [employer] password. You can change your password at any time. 7. Enter your Password Reset Question and Answer. This can be used at a later time if you forget your password. 8. Enter your e-mail address. You will receive e-mail notification when new information is available in [address] 9. Click Sign Up. You can now view and download portions of your medical record. 10. Click the Download Summary menu link to download a portable copy of your medical information. If you have questions, please visit the Frequently Asked Questions section of the [employer] website. Remember, [employer] is NOT to be used for urgent needs. For medical emergencies, dial 911. Now available from your iPhone and Android! Please provide this summary of care documentation to your next provider. Your primary care clinician is listed as Beverly Roth. If you have any questions after today's visit, please call [phone].
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
## 2017-06-26 NOTE — PATIENT INSTRUCTIONS
Zyrtec 10mg and flonase over the counter for post nasal drip.      Schedule pelvic exam       Labs today

## 2017-06-27 LAB
ALBUMIN SERPL-MCNC: 4 G/DL (ref 3.5–5.5)
ALBUMIN/GLOB SERPL: 1.7 {RATIO} (ref 1.2–2.2)
ALP SERPL-CCNC: 73 IU/L (ref 39–117)
ALT SERPL-CCNC: 10 IU/L (ref 0–32)
AST SERPL-CCNC: 12 IU/L (ref 0–40)
BILIRUB SERPL-MCNC: <0.2 MG/DL (ref 0–1.2)
BUN SERPL-MCNC: 10 MG/DL (ref 6–20)
BUN/CREAT SERPL: 15 (ref 9–23)
CALCIUM SERPL-MCNC: 9.1 MG/DL (ref 8.7–10.2)
CHLORIDE SERPL-SCNC: 102 MMOL/L (ref 96–106)
CHOLEST SERPL-MCNC: 213 MG/DL (ref 100–199)
CO2 SERPL-SCNC: 24 MMOL/L (ref 18–29)
CREAT SERPL-MCNC: 0.65 MG/DL (ref 0.57–1)
ERYTHROCYTE [DISTWIDTH] IN BLOOD BY AUTOMATED COUNT: 14.3 % (ref 12.3–15.4)
EST. AVERAGE GLUCOSE BLD GHB EST-MCNC: 117 MG/DL
GLOBULIN SER CALC-MCNC: 2.4 G/DL (ref 1.5–4.5)
GLUCOSE SERPL-MCNC: 95 MG/DL (ref 65–99)
HBA1C MFR BLD: 5.7 % (ref 4.8–5.6)
HCT VFR BLD AUTO: 38 % (ref 34–46.6)
HDLC SERPL-MCNC: 46 MG/DL
HGB BLD-MCNC: 12.4 G/DL (ref 11.1–15.9)
LDLC SERPL CALC-MCNC: 139 MG/DL (ref 0–99)
MCH RBC QN AUTO: 28.6 PG (ref 26.6–33)
MCHC RBC AUTO-ENTMCNC: 32.6 G/DL (ref 31.5–35.7)
MCV RBC AUTO: 88 FL (ref 79–97)
PLATELET # BLD AUTO: 427 X10E3/UL (ref 150–379)
POTASSIUM SERPL-SCNC: 4.8 MMOL/L (ref 3.5–5.2)
PROT SERPL-MCNC: 6.4 G/DL (ref 6–8.5)
RBC # BLD AUTO: 4.34 X10E6/UL (ref 3.77–5.28)
SODIUM SERPL-SCNC: 139 MMOL/L (ref 134–144)
TRIGL SERPL-MCNC: 138 MG/DL (ref 0–149)
TSH SERPL DL<=0.005 MIU/L-ACNC: 0.72 UIU/ML (ref 0.45–4.5)
VLDLC SERPL CALC-MCNC: 28 MG/DL (ref 5–40)
WBC # BLD AUTO: 9.9 X10E3/UL (ref 3.4–10.8)

## 2017-10-17 NOTE — TELEPHONE ENCOUNTER
Requested Prescriptions     Pending Prescriptions Disp Refills    lidocaine 5 % topical cream 300 g 0     Sig: Apply  to affected area two (2) times daily as needed for Itching.          Last Office Visit: 6.26.17    Upcoming [id number]:9.11.34

## 2017-10-20 RX ORDER — SENNOSIDES 25 MG/1
TABLET, FILM COATED ORAL
Qty: 300 G | Refills: 0 | OUTPATIENT
Start: 2017-10-20

## 2018-02-07 ENCOUNTER — OFFICE VISIT (OUTPATIENT)
Dept: INTERNAL MEDICINE CLINIC | Age: 33
End: 2018-02-07

## 2018-02-07 VITALS
OXYGEN SATURATION: 97 % | BODY MASS INDEX: 46.98 KG/M2 | HEART RATE: 87 BPM | DIASTOLIC BLOOD PRESSURE: 79 MMHG | TEMPERATURE: 98.4 F | SYSTOLIC BLOOD PRESSURE: 132 MMHG | WEIGHT: 282 LBS | RESPIRATION RATE: 16 BRPM | HEIGHT: 65 IN

## 2018-02-07 DIAGNOSIS — E55.9 VITAMIN D DEFICIENCY: ICD-10-CM

## 2018-02-07 DIAGNOSIS — E66.01 MORBID OBESITY WITH BMI OF 45.0-49.9, ADULT (HCC): Chronic | ICD-10-CM

## 2018-02-07 DIAGNOSIS — R73.01 IFG (IMPAIRED FASTING GLUCOSE): ICD-10-CM

## 2018-02-07 DIAGNOSIS — J40 BRONCHITIS: Primary | ICD-10-CM

## 2018-02-07 DIAGNOSIS — E78.5 DYSLIPIDEMIA: ICD-10-CM

## 2018-02-07 RX ORDER — PREDNISONE 20 MG/1
TABLET ORAL
Qty: 12 TAB | Refills: 0 | Status: SHIPPED | OUTPATIENT
Start: 2018-02-07 | End: 2018-09-12

## 2018-02-07 RX ORDER — AZITHROMYCIN 250 MG/1
250 TABLET, FILM COATED ORAL SEE ADMIN INSTRUCTIONS
Qty: 6 TAB | Refills: 0 | Status: SHIPPED | OUTPATIENT
Start: 2018-02-07 | End: 2018-02-12

## 2018-02-07 RX ORDER — ERGOCALCIFEROL 1.25 MG/1
50000 CAPSULE ORAL
Qty: 8 CAP | Refills: 0 | Status: SHIPPED | OUTPATIENT
Start: 2018-02-07 | End: 2018-09-12

## 2018-02-07 RX ORDER — CETIRIZINE HCL 10 MG
10 TABLET ORAL
Qty: 90 TAB | Refills: 1 | Status: SHIPPED | OUTPATIENT
Start: 2018-02-07 | End: 2020-11-24 | Stop reason: SDUPTHER

## 2018-02-07 NOTE — MR AVS SNAPSHOT
[address] [facility] 
[phone] Patient: Evan Gonzalez MRN: [id number] UJQ:[birth year] Visit Information Date & Time Provider Department Dept. Phone Encounter #  
 2/7/2018  2:45 PM Mj Schneider, [address] [place] [address],[address] [phone] [id number] Follow-up Instructions Return in about 5 months (around 7/7/2018). Your Appointments 6/26/2018 10:30 AM  
PHYSICAL with Mj Schneider, [address] [facility]) Appt Note: 1 year annual  
 [facility] [address] [phone]  
[address] [place] [address] [address] [facility] Upcoming Health Maintenance Date Due  
 PAP AKA CERVICAL CYTOLOGY 3/14/2019 DTaP/Tdap/Td series (2 - Td) 5/6/2023 Allergies as of 2/7/2018  Review Complete On: 2/7/2018 By: Mj Schneider MD  
 No Known Allergies Current Immunizations  Reviewed on 2/21/2017 Name Date MMR 4/23/1996 Tdap 5/6/2013  9:30 AM  
  
 Not reviewed this visit You Were Diagnosed With   
  
 Codes Comments Bronchitis    -  Primary ICD-10-CM: Y46 ICD-9-CM: 891 Morbid obesity with BMI of 45.0-49.9, adult (HCC)     ICD-10-CM: E66.01, Z68.42 
ICD-9-CM: 278.01, V85.42   
 IFG (impaired fasting glucose)     ICD-10-CM: R73.01 
ICD-9-CM: 790.21 Dyslipidemia     ICD-10-CM: E78.5 ICD-9-CM: 272.4 Vitamin D deficiency     ICD-10-CM: E55.9 ICD-9-CM: 268.9 Vitals BP Pulse Temp Resp Height(growth percentile) Weight(growth percentile) 132/79 (BP 1 Location: Left arm, BP Patient Position: Sitting) 87 98.4 °F (36.9 °C) (Oral) 16 5' 5\" (1.651 m) 282 lb (127.9 kg) SpO2 BMI OB Status Smoking Status 97% 46.93 kg/m2 Having regular periods Former Smoker Vitals History BMI and BSA Data  Body Mass Index Body Surface Area  
 46.93 kg/m 2 2.42 m 2  
  
  
 Preferred Pharmacy Pharmacy Name Phone [facility] [address], [facility] ([place], [place]) [phone] Your Updated Medication List  
  
   
This list is accurate as of: 18  3:13 PM.  Always use your most recent med list.  
  
  
  
  
 azithromycin 250 mg tablet Commonly known as:  [place] Julianna Take 1 Tab by mouth See Admin Instructions for 5 days. cetirizine 10 mg tablet Commonly known as:  ZYRTEC Take 1 Tab by mouth nightly.  
  
 ergocalciferol 50,000 unit capsule Commonly known as:  ERGOCALCIFEROL Take 1 Cap by mouth every seven (7) days. ibuprofen 800 mg tablet Commonly known as:  MOTRIN Take 1 Tab by mouth every eight (8) hours as needed for Pain. predniSONE 20 mg tablet Commonly known as:  Seda Lard 60mg po daily for 2days, 40mg po daily for 2 days, 20mg po daily for 2days then stop PROAIR HFA 90 mcg/actuation inhaler Generic drug:  albuterol Take  by inhalation. Prescriptions Sent to Pharmacy Refills  
 azithromycin (ZITHROMAX) 250 mg tablet 0 Sig: Take 1 Tab by mouth See Admin Instructions for 5 days. Class: Normal  
 Pharmacy: [facility] [address] [place] [address] Ph #: [phone] Route: Oral  
 predniSONE (DELTASONE) 20 mg tablet 0 Simg po daily for 2days, 40mg po daily for 2 days, 20mg po daily for 2days then stop Class: Normal  
 Pharmacy: [facility] [address] [place] [address] Ph #: [phone]  
 cetirizine (ZYRTEC) 10 mg tablet 1 Sig: Take 1 Tab by mouth nightly. Class: Normal  
 Pharmacy: [facility] [address] [place] [address] Ph #: [phone]  Route: Oral  
 ergocalciferol (ERGOCALCIFEROL) 50,000 unit capsule 0  
 Sig: Take 1 Cap by mouth every seven (7) days. Class: Normal  
 Pharmacy: [facility] [address], [address] [place] [address] #: [phone] Route: Oral  
  
We Performed the Following HEMOGLOBIN A1C WITH EAG [64910 CPT(R)] METABOLIC PANEL, BASIC [33535 CPT(R)] Follow-up Instructions Return in about 5 months (around 7/7/2018). Patient Instructions Take weekly prescribed vitamin d for 8 weeks THEN START 2000units daily over the counter Introducing [facility]! Cynthia Bustos introduces [employer] patient portal. Now you can access parts of your medical record, email your doctor's office, and request medication refills online. 1. In your internet browser, go to [email or web address] 2. Click on the First Time User? Click Here link in the Sign In box. You will see the New Member Sign Up page. 3. Enter your [employer] Access Code exactly as it appears below. You will not need to use this code after youve completed the sign-up process. If you do not sign up before the expiration date, you must request a new code. · [employer] Access Code: [email or web address] Expires: 5/8/2018  3:12 PM 
 
4. Enter the last four digits of your Social Security Number (xxxx) and Date of Birth (mm/dd/yyyy) as indicated and click Submit. You will be taken to the next sign-up page. 5. Create a [employer] ID. This will be your [employer] login ID and cannot be changed, so think of one that is secure and easy to remember. 6. Create a [employer] password. You can change your password at any time. 7. Enter your Password Reset Question and Answer. This can be used at a later time if you forget your password. 8. Enter your e-mail address. You will receive e-mail notification when new information is available in [address] 9. Click Sign Up. You can now view and download portions of your medical record. 10. Click the Download Summary menu link to download a portable copy of your medical information. If you have questions, please visit the Frequently Asked Questions section of the [employer] website. Remember, [employer] is NOT to be used for urgent needs. For medical emergencies, dial 911. Now available from your iPhone and Android! Please provide this summary of care documentation to your next provider. Your primary care clinician is listed as Janay Correia. If you have any questions after today's visit, please call [phone].

## 2018-02-07 NOTE — PROGRESS NOTES
HISTORY OF PRESENT ILLNESS  Fany Peter is a 28 y.o. female. HPI   Last here 6/26/17. Pt is here for acute/routine care.     BP today is 132/79    Wt is stable since last visit   Pt is not motivated to work on diet and w/l  Pt has been drinking a lot of water (pt occasionally drinks sweet teas)  Pt has not been eating a lot of fried foods or carbs  Pt has been eating some mashed potatoes and french fries  Discussed decreasing carbohydrate and increasing protein intake   Discussed decreasing caloric beverage and increasing water intake    Discussed joining [employer]     Reviewed last labs 6/17: LDL elevation and mild pre-diabetes  Will get labs today     Pt c/o ear \"popping\", wheezing (1 week), cough and SOB x 1/18 - on and off  Pt denies having ear pain, sore throat or F/C  Pt has been using an inhaler with no improvement to sx  Pt has been taking motrin OTC for her sx  Pt has also been taking flonase and zyrtec with improvement to sx  Ordered zpak  Ordered prednisone taper  If her sx do not improve, will order CXR    Pt has not been taking vit D OTC daily  Ordered vit D OTC 50K units weekly  Advised her to take vit D OTC 2000U daily once she completes the weekly course    Recall pt used to take celexa and buspar for anxiety/depression     PREVENTIVE:  Colonoscopy: fmhx colon cancer - maternal aunt around age 40, referred, reminded  Pap: YECENIA Oleary, 11/17  Mammogram: not yet needed, denies fmhx  Tdap: 5/06/2013  Flu shot: declines   A1c: 10/15 5.9, 2/17 5.9, 6/17 5.7  Eye exam: Dr. Lisandro Chopra, 4/17  Lipids: 6/17     Patient Active Problem List    Diagnosis Date Noted    Morbid obesity with BMI of 45.0-49.9, adult ([facility] [address] 12/09/2016    Adjustment reaction with anxiety 12/21/2015     Current Outpatient Prescriptions   Medication Sig Dispense Refill    ibuprofen (MOTRIN) 800 mg tablet Take 1 Tab by mouth every eight (8) hours as needed for Pain.  40 Tab 0    albuterol (PROAIR HFA) 90 mcg/actuation inhaler Take  by inhalation. Past Surgical History:   Procedure Laterality Date    HX BREAST BIOPSY  6/2012    LEFT breast excisional biopsy    HX HEENT      orbital fracture     HX ORTHOPAEDIC      right knee      Lab Results  Component Value Date/Time   WBC 9.9 06/26/2017 11:06 AM   HGB 12.4 06/26/2017 11:06 AM   HCT 38.0 06/26/2017 11:06 AM   PLATELET 792 (H) 38/54/3967 11:06 AM   MCV 88 06/26/2017 11:06 AM     Lab Results  Component Value Date/Time   Cholesterol, total 213 (H) 06/26/2017 11:06 AM   HDL Cholesterol 46 06/26/2017 11:06 AM   LDL, calculated 139 (H) 06/26/2017 11:06 AM   Triglyceride 138 06/26/2017 11:06 AM     Lab Results  Component Value Date/Time   GFR est non- 06/26/2017 11:06 AM   GFR est  06/26/2017 11:06 AM   Creatinine 0.65 06/26/2017 11:06 AM   BUN 10 06/26/2017 11:06 AM   Sodium 139 06/26/2017 11:06 AM   Potassium 4.8 06/26/2017 11:06 AM   Chloride 102 06/26/2017 11:06 AM   CO2 24 06/26/2017 11:06 AM        Review of Systems   Constitutional: Negative for chills and fever. HENT: Negative for ear pain and sore throat. Respiratory: Positive for cough, shortness of breath and wheezing. Cardiovascular: Negative for chest pain. Physical Exam   Constitutional: She is oriented to person, place, and time. She appears well-developed and well-nourished. No distress. HENT:   Head: Normocephalic and atraumatic. Right Ear: External ear normal.   Left Ear: External ear normal.   Mouth/Throat: No oropharyngeal exudate. Erythema to BL TM  Mild erythema to oropharynx   Eyes: Conjunctivae and EOM are normal. Right eye exhibits no discharge. Left eye exhibits no discharge. Neck: Normal range of motion. Neck supple. Cardiovascular: Normal rate, regular rhythm and normal heart sounds. Exam reveals no gallop and no friction rub. No murmur heard. Pulmonary/Chest: Effort normal and breath sounds normal. No respiratory distress. She has no wheezes. She has no rales.  She exhibits no tenderness. Musculoskeletal: Normal range of motion. She exhibits no edema, tenderness or deformity. Lymphadenopathy:     She has no cervical adenopathy. Neurological: She is alert and oriented to person, place, and time. Coordination normal.   Skin: Skin is warm and dry. No rash noted. She is not diaphoretic. No erythema. No pallor. Psychiatric: She has a normal mood and affect. Her behavior is normal.       ASSESSMENT and PLAN    ICD-10-CM ICD-9-CM    1. Bronchitis    Will treat with zpak and prednisone taper, discussed if cough not improving would need CXR next   J40 490    2. Morbid obesity with BMI of 45.0-49.9, adult ([facility] [address]    Discussed diet and weight loss, discussed WW, needs to focus on reducing carbs in her diet   E66.01 278.01     Z68.42 V85.42    3. IFG (impaired fasting glucose)    Repeat a1c today, discussed risk of progression to diabetes if wt does not improve   C55.18 761.00 METABOLIC PANEL, BASIC      HEMOGLOBIN A1C WITH EAG   4. Dyslipidemia    Diet-controled for now, again needs to focus on w/l   E78.5 272.4    5. Vitamin D deficiency    Start 50K units weekly for 8 weeks, once this is complete take 2000U OTC daily vit D   E55.9 268.9         Scribed by Luis Carlos Nunez of [address] [place] [address] [address], as dictated by Dr. Pati Goldman. Current diagnosis and concerns discussed with pt at length. Pt understands risks and benefits or current treatment plan and medications, and accepts the treatment and medication with any possible risks. Pt asks appropriate questions, which were answered. Pt was instructed to call with any concerns or problems. This note will not be viewable in [address]

## 2018-02-08 LAB
BUN SERPL-MCNC: 10 MG/DL (ref 6–20)
BUN/CREAT SERPL: 17 (ref 9–23)
CALCIUM SERPL-MCNC: 9.3 MG/DL (ref 8.7–10.2)
CHLORIDE SERPL-SCNC: 101 MMOL/L (ref 96–106)
CO2 SERPL-SCNC: 23 MMOL/L (ref 18–29)
CREAT SERPL-MCNC: 0.6 MG/DL (ref 0.57–1)
EST. AVERAGE GLUCOSE BLD GHB EST-MCNC: 123 MG/DL
GFR SERPLBLD CREATININE-BSD FMLA CKD-EPI: 121 ML/MIN/1.73
GFR SERPLBLD CREATININE-BSD FMLA CKD-EPI: 139 ML/MIN/1.73
GLUCOSE SERPL-MCNC: 95 MG/DL (ref 65–99)
HBA1C MFR BLD: 5.9 % (ref 4.8–5.6)
POTASSIUM SERPL-SCNC: 4.1 MMOL/L (ref 3.5–5.2)
SODIUM SERPL-SCNC: 140 MMOL/L (ref 134–144)

## 2018-02-08 NOTE — PROGRESS NOTES
Luis Miguel  send letter AND pt has 2 appointments scheduled for f/u find out which she will attend and cancel the other

## 2018-02-13 ENCOUNTER — TELEPHONE (OUTPATIENT)
Dept: INTERNAL MEDICINE CLINIC | Age: 33
End: 2018-02-13

## 2018-02-13 NOTE — TELEPHONE ENCOUNTER
Pt returning a missed call on 02/12/18, pt would like the results of laB work completed on 02/07/18. Best contact number [phone].        Message received & copied from [facility]

## 2018-02-13 NOTE — TELEPHONE ENCOUNTER
Spoke with patient. Pt verbalized understanding of information discussed w/ no further questions at this time. Patient called stating that she missed a call yesterday and was just returning the call. Notified patient that the call was to inform her that her recent lab results were normal.  Notified patient that a letter with her results has also been mailed to her. Pt verbalized understanding of information discussed w/ no further questions at this time.

## 2018-02-15 ENCOUNTER — TELEPHONE (OUTPATIENT)
Dept: INTERNAL MEDICINE CLINIC | Age: 33
End: 2018-02-15

## 2018-02-15 NOTE — TELEPHONE ENCOUNTER
Called and spoke to pt. Two pt identifiers confirmed. Pt had two f/u appointments. Confirmed with pt, keeping 6/26/18 appt and canceling 7/11/18 appt. No further questions at time of call.

## 2018-09-12 ENCOUNTER — OFFICE VISIT (OUTPATIENT)
Dept: INTERNAL MEDICINE CLINIC | Age: 33
End: 2018-09-12

## 2018-09-12 VITALS
SYSTOLIC BLOOD PRESSURE: 111 MMHG | RESPIRATION RATE: 16 BRPM | HEIGHT: 65 IN | TEMPERATURE: 98.1 F | DIASTOLIC BLOOD PRESSURE: 73 MMHG | HEART RATE: 60 BPM | BODY MASS INDEX: 45.82 KG/M2 | WEIGHT: 275 LBS | OXYGEN SATURATION: 100 %

## 2018-09-12 DIAGNOSIS — E66.01 MORBID OBESITY WITH BMI OF 45.0-49.9, ADULT (HCC): Chronic | ICD-10-CM

## 2018-09-12 DIAGNOSIS — R73.01 IFG (IMPAIRED FASTING GLUCOSE): ICD-10-CM

## 2018-09-12 DIAGNOSIS — E55.9 VITAMIN D DEFICIENCY: ICD-10-CM

## 2018-09-12 DIAGNOSIS — M79.605 LEG PAIN, LEFT: ICD-10-CM

## 2018-09-12 DIAGNOSIS — Z00.00 PHYSICAL EXAM, ANNUAL: Primary | ICD-10-CM

## 2018-09-12 NOTE — MR AVS SNAPSHOT
[address] [facility] 
[phone] Patient: Mary Shoemaker MRN: [id number] Gnosticist:8/96/2963 Visit Information Date & Time Provider Department Dept. Phone Encounter #  
 9/12/2018 12:00 PM Julisa Corley, [address] [place] [address],[address] [phone] [id number] Follow-up Instructions Return in about 1 year (around 9/12/2019). Upcoming Health Maintenance Date Due Influenza Age 5 to Adult 6/10/2019* PAP AKA CERVICAL CYTOLOGY 3/14/2019 DTaP/Tdap/Td series (2 - Td) 5/6/2023 *Topic was postponed. The date shown is not the original due date. Allergies as of 9/12/2018  Review Complete On: 9/12/2018 By: Julisa Corley MD  
 No Known Allergies Current Immunizations  Reviewed on 2/21/2017 Name Date MMR 4/23/1996 Tdap 5/6/2013  9:30 AM  
  
 Not reviewed this visit You Were Diagnosed With   
  
 Codes Comments Physical exam, annual    -  Primary ICD-10-CM: Z00.00 ICD-9-CM: V70.0 Vitamin D deficiency     ICD-10-CM: E55.9 ICD-9-CM: 268.9 Morbid obesity with BMI of 45.0-49.9, adult (HCC)     ICD-10-CM: E66.01, Z68.42 
ICD-9-CM: 278.01, V85.42   
 IFG (impaired fasting glucose)     ICD-10-CM: R73.01 
ICD-9-CM: 790.21 Leg pain, left     ICD-10-CM: M79.605 ICD-9-CM: 729.5 Vitals BP Pulse Temp Resp Height(growth percentile) Weight(growth percentile) 111/73 (BP 1 Location: Left arm, BP Patient Position: Sitting) 60 98.1 °F (36.7 °C) (Oral) 16 5' 5\" (1.651 m) 275 lb (124.7 kg) SpO2 BMI OB Status Smoking Status 100% 45.76 kg/m2 Having regular periods Former Smoker Vitals History BMI and BSA Data Body Mass Index Body Surface Area 45.76 kg/m 2 2.39 m 2 Preferred Pharmacy Pharmacy Name Phone [facility] [address], [facility] [address] [phone] Your Updated Medication List  
  
   
This list is accurate as of 9/12/18 12:07 PM.  Always use your most recent med list.  
  
  
  
  
 cetirizine 10 mg tablet Commonly known as:  ZYRTEC Take 1 Tab by mouth nightly. ibuprofen 800 mg tablet Commonly known as:  MOTRIN Take 1 Tab by mouth every eight (8) hours as needed for Pain. PROAIR HFA 90 mcg/actuation inhaler Generic drug:  albuterol Take  by inhalation. We Performed the Following CBC W/O DIFF [46845 CPT(R)] HEMOGLOBIN A1C WITH EAG [50107 CPT(R)] LIPID PANEL [30820 CPT(R)] METABOLIC PANEL, COMPREHENSIVE [69690 CPT(R)] REFERRAL TO GASTROENTEROLOGY [EWM60 Custom] TSH 3RD GENERATION [86589 CPT(R)] VITAMIN D, 25 HYDROXY [id number] CPT(R)] Follow-up Instructions Return in about 1 year (around 9/12/2019). Referral Information Referral ID Referred By Referred To  
  
 [id number] Olu Maciel MD   
   [address] [place], [place] [address] [address] [facility], [address] Phone: [phone] Fax: [phone] Visits Status Start Date End Date 1 New Request 9/12/18 9/12/19 If your referral has a status of pending review or denied, additional information will be sent to support the outcome of this decision. Patient Instructions Schedule eye exam and colonoscopy Labs today Introducing [facility]! Jose Galicia introduces [employer] patient portal. Now you can access parts of your medical record, email your doctor's office, and request medication refills online. 1. In your internet browser, go to [email or web address] 2. Click on the First Time User? Click Here link in the Sign In box. You will see the New Member Sign Up page. 3. Enter your [employer] Access Code exactly as it appears below. You will not need to use this code after youve completed the sign-up process.  If you do not sign up before the expiration date, you must request a new code. · [employer] Access Code: [email or web address] Expires: 12/11/2018 11:58 AM 
 
4. Enter the last four digits of your Social Security Number (xxxx) and Date of Birth (mm/dd/yyyy) as indicated and click Submit. You will be taken to the next sign-up page. 5. Create a [employer] ID. This will be your [employer] login ID and cannot be changed, so think of one that is secure and easy to remember. 6. Create a [employer] password. You can change your password at any time. 7. Enter your Password Reset Question and Answer. This can be used at a later time if you forget your password. 8. Enter your e-mail address. You will receive e-mail notification when new information is available in [address] 9. Click Sign Up. You can now view and download portions of your medical record. 10. Click the Download Summary menu link to download a portable copy of your medical information. If you have questions, please visit the Frequently Asked Questions section of the [employer] website. Remember, [employer] is NOT to be used for urgent needs. For medical emergencies, dial 911. Now available from your iPhone and Android! Please provide this summary of care documentation to your next provider. Your primary care clinician is listed as Aditi Woodard. If you have any questions after today's visit, please call [phone].

## 2018-09-12 NOTE — PROGRESS NOTES
HISTORY OF PRESENT ILLNESS Jovon Ramon is a 35 y.o. female. HPI Last here 2/7/18. Pt is here for acute/routine care. 
   
BP today is 111/73 
  
Wt is down 7 lbs since last visit Pt will be restarting a boot camp, which does meal planning in addition to exercise The meal plan provided with this is low carb, high protein, decreased sugars Pt does not drink sugar drinks every day Discussed diet and w/l   
   
Reviewed last labs 2/18 Ordered labs Pt takes zyrtec daily 
  
Lov, ordered vit D OTC 50K units weekly and advised OTC after this However pt is not taking any vit D OTC, states she will get back on this Pt c/o L leg pain when she is lying down at night x 2-3 months Describes it as an ache Pt states she had a staph infection in this leg in the past and wonders if this has anything to do with it, discussed it could be related Pt does not have pain when walking Discussed vit D possibly contributing Pt has not taken anything for her sx She just props her leg up and this provides relief For now, will start with getting vit D levels up 
  
Recall pt used to take celexa and buspar for anxiety/depression 
   
PREVENTIVE: 
Colonoscopy: fmhx colon cancer - maternal aunt (~age 40) and a first cousin once removed age 42's, referred, reminded, referred again Pap: YECENIA Oleary, 11/17 Mammogram: not yet needed, denies fmhx Tdap: 5/06/2013 Flu shot: declines A1c: 10/15 5.9, 2/17 5.9, 6/17 5.7, 2/18 5.9 Eye exam: Dr. Janie Santiago, 4/17, due Lipids: 6/17  Patient Active Problem List  
 Diagnosis Date Noted  Morbid obesity with BMI of 45.0-49.9, adult ([facility] [address] 12/09/2016  Adjustment reaction with anxiety 12/21/2015 Current Outpatient Prescriptions Medication Sig Dispense Refill  predniSONE (DELTASONE) 20 mg tablet 60mg po daily for 2days, 40mg po daily for 2 days, 20mg po daily for 2days then stop 12 Tab 0  
  cetirizine (ZYRTEC) 10 mg tablet Take 1 Tab by mouth nightly. 90 Tab 1  
 ergocalciferol (ERGOCALCIFEROL) 50,000 unit capsule Take 1 Cap by mouth every seven (7) days. 8 Cap 0  ibuprofen (MOTRIN) 800 mg tablet Take 1 Tab by mouth every eight (8) hours as needed for Pain. 40 Tab 0  
 albuterol (PROAIR HFA) 90 mcg/actuation inhaler Take  by inhalation. Past Surgical History:  
Procedure Laterality Date  HX BREAST BIOPSY  6/2012 LEFT breast excisional biopsy  HX HEENT    
 orbital fracture  HX ORTHOPAEDIC    
 right knee Lab Results Component Value Date/Time WBC 9.9 06/26/2017 11:06 AM  
HGB 12.4 06/26/2017 11:06 AM  
HCT 38.0 06/26/2017 11:06 AM  
PLATELET 653 (H) 61/02/3812 11:06 AM  
MCV 88 06/26/2017 11:06 AM  
 
Lab Results Component Value Date/Time Cholesterol, total 213 (H) 06/26/2017 11:06 AM  
HDL Cholesterol 46 06/26/2017 11:06 AM  
LDL, calculated 139 (H) 06/26/2017 11:06 AM  
Triglyceride 138 06/26/2017 11:06 AM  
 
Lab Results Component Value Date/Time GFR est non- 02/07/2018 03:21 PM  
GFR est  02/07/2018 03:21 PM  
Creatinine 0.60 02/07/2018 03:21 PM  
BUN 10 02/07/2018 03:21 PM  
Sodium 140 02/07/2018 03:21 PM  
Potassium 4.1 02/07/2018 03:21 PM  
Chloride 101 02/07/2018 03:21 PM  
CO2 23 02/07/2018 03:21 PM  
  
 
Review of Systems Respiratory: Negative for shortness of breath. Cardiovascular: Negative for chest pain. Physical Exam  
Constitutional: She is oriented to person, place, and time. She appears well-developed and well-nourished. No distress. HENT:  
Head: Normocephalic and atraumatic. Right Ear: External ear normal.  
Left Ear: External ear normal.  
Eyes: Conjunctivae and EOM are normal. Pupils are equal, round, and reactive to light. Right eye exhibits no discharge. Left eye exhibits no discharge. No scleral icterus. Neck: Normal range of motion. Neck supple. No carotid bruits Cardiovascular: Normal rate, regular rhythm, normal heart sounds and intact distal pulses. Exam reveals no gallop and no friction rub. No murmur heard. Pulmonary/Chest: Effort normal and breath sounds normal. No respiratory distress. She has no wheezes. She has no rales. She exhibits no tenderness. Abdominal: Soft. She exhibits no distension and no mass. There is no tenderness. There is no rebound and no guarding. Musculoskeletal: Normal range of motion. She exhibits no edema, tenderness or deformity. Negative BL SLRs Lymphadenopathy:  
  She has no cervical adenopathy. Neurological: She is alert and oriented to person, place, and time. Coordination normal.  
Skin: Skin is warm and dry. No rash noted. She is not diaphoretic. No erythema. No pallor. Psychiatric: She has a normal mood and affect. Her behavior is normal.  
 
 
ASSESSMENT and PLAN 
  ICD-10-CM ICD-9-CM 1. Physical exam, annual 
 
Discussed diet and w/l, pt is working on low carb diet, pelvic exam in November, eye exam due, declines flu shot, discussed colon screening d/t fmhx of very early colon cancer Z00.00 V70.0 REFERRAL TO GASTROENTEROLOGY  
   LIPID PANEL  
   METABOLIC PANEL, COMPREHENSIVE  
   CBC W/O DIFF  
   TSH 3RD GENERATION  
   HEMOGLOBIN A1C WITH EAG  
   VITAMIN D, 25 HYDROXY 2. Vitamin D deficiency Check level and treat prn, pt is not taking any OTC vit D 
 E55.9 268.9 3. Morbid obesity with BMI of 45.0-49.9, adult ([facility] [address] Pt is starting a w/l program that includes a low carb diet and exercise E66.01 278.01   
 Z68.42 V85.42   
4. IFG (impaired fasting glucose) Check a1c 
 R73.01 790.21 5. Leg pain, left Likely related to low vit D, will start with addressing vit D levels, if not improving will consider sciatic origin as possible cause and further evaluate 
 M79.605 729.5 Scribed by Marialuisa Dimas of [facility], as dictated by Dr. Lloyd Aguayo. Current diagnosis and concerns discussed with pt at length. Pt understands risks and benefits or current treatment plan and medications, and accepts the treatment and medication with any possible risks. Pt asks appropriate questions, which were answered. Pt was instructed to call with any concerns or problems. This note will not be viewable in [address]

## 2018-09-13 LAB
25(OH)D3+25(OH)D2 SERPL-MCNC: 12.2 NG/ML (ref 30–100)
ALBUMIN SERPL-MCNC: 4.1 G/DL (ref 3.5–5.5)
ALBUMIN/GLOB SERPL: 1.7 {RATIO} (ref 1.2–2.2)
ALP SERPL-CCNC: 70 IU/L (ref 39–117)
ALT SERPL-CCNC: 10 IU/L (ref 0–32)
AST SERPL-CCNC: 12 IU/L (ref 0–40)
BILIRUB SERPL-MCNC: 0.3 MG/DL (ref 0–1.2)
BUN SERPL-MCNC: 8 MG/DL (ref 6–20)
BUN/CREAT SERPL: 12 (ref 9–23)
CALCIUM SERPL-MCNC: 9.3 MG/DL (ref 8.7–10.2)
CHLORIDE SERPL-SCNC: 102 MMOL/L (ref 96–106)
CHOLEST SERPL-MCNC: 215 MG/DL (ref 100–199)
CO2 SERPL-SCNC: 22 MMOL/L (ref 20–29)
CREAT SERPL-MCNC: 0.67 MG/DL (ref 0.57–1)
ERYTHROCYTE [DISTWIDTH] IN BLOOD BY AUTOMATED COUNT: 14.8 % (ref 12.3–15.4)
EST. AVERAGE GLUCOSE BLD GHB EST-MCNC: 123 MG/DL
GLOBULIN SER CALC-MCNC: 2.4 G/DL (ref 1.5–4.5)
GLUCOSE SERPL-MCNC: 88 MG/DL (ref 65–99)
HBA1C MFR BLD: 5.9 % (ref 4.8–5.6)
HCT VFR BLD AUTO: 39.4 % (ref 34–46.6)
HDLC SERPL-MCNC: 47 MG/DL
HGB BLD-MCNC: 12.5 G/DL (ref 11.1–15.9)
LDLC SERPL CALC-MCNC: 132 MG/DL (ref 0–99)
MCH RBC QN AUTO: 28.1 PG (ref 26.6–33)
MCHC RBC AUTO-ENTMCNC: 31.7 G/DL (ref 31.5–35.7)
MCV RBC AUTO: 89 FL (ref 79–97)
PLATELET # BLD AUTO: 420 X10E3/UL (ref 150–379)
POTASSIUM SERPL-SCNC: 4.4 MMOL/L (ref 3.5–5.2)
PROT SERPL-MCNC: 6.5 G/DL (ref 6–8.5)
RBC # BLD AUTO: 4.45 X10E6/UL (ref 3.77–5.28)
SODIUM SERPL-SCNC: 139 MMOL/L (ref 134–144)
TRIGL SERPL-MCNC: 178 MG/DL (ref 0–149)
TSH SERPL DL<=0.005 MIU/L-ACNC: 1.06 UIU/ML (ref 0.45–4.5)
VLDLC SERPL CALC-MCNC: 36 MG/DL (ref 5–40)
WBC # BLD AUTO: 9.9 X10E3/UL (ref 3.4–10.8)

## 2018-09-13 NOTE — PROGRESS NOTES
vit D is low--the patient needs 50,000 units weekly for 8 weeks then start a daily 2000unit supplement instead. Work on diet/wt loss for lipids/a1c Rest fine

## 2018-09-14 NOTE — PROGRESS NOTES
Called, spoke to pt. Two pt identifiers confirmed. Pt informed per Dr. Navarro Masters vit D is low--the patient needs 50,000 units weekly for 8 weeks then start a daily 2000unit supplement instead. Pended. Pt informed per Dr. Navarro Masters work on diet/wt loss for lipids/a1c. Pt informed rest of labs stable. Pt verbalized understanding of information discussed w/ no further questions at this time.

## 2019-11-15 ENCOUNTER — TELEPHONE (OUTPATIENT)
Dept: INTERNAL MEDICINE CLINIC | Age: 34
End: 2019-11-15

## 2019-11-15 NOTE — TELEPHONE ENCOUNTER
Patient states she needs a call back in reference to getting a sooner appt for complete Physical with Dr. Wilson . Please call.  Thank you

## 2019-11-18 NOTE — TELEPHONE ENCOUNTER
Called, spoke to pt. Two pt identifiers confirmed. Pt offered and accepted appt for 11/20/19 5204. Pt verbalized understanding of information discussed w/ no further questions at this time.

## 2019-11-20 ENCOUNTER — OFFICE VISIT (OUTPATIENT)
Dept: INTERNAL MEDICINE CLINIC | Age: 34
End: 2019-11-20

## 2019-11-20 VITALS
BODY MASS INDEX: 48.32 KG/M2 | SYSTOLIC BLOOD PRESSURE: 126 MMHG | RESPIRATION RATE: 16 BRPM | DIASTOLIC BLOOD PRESSURE: 79 MMHG | OXYGEN SATURATION: 97 % | HEIGHT: 65 IN | WEIGHT: 290 LBS | HEART RATE: 82 BPM | TEMPERATURE: 98.6 F

## 2019-11-20 DIAGNOSIS — Z00.00 PHYSICAL EXAM, ANNUAL: Primary | ICD-10-CM

## 2019-11-20 DIAGNOSIS — L02.92 BOIL: ICD-10-CM

## 2019-11-20 DIAGNOSIS — R73.01 IFG (IMPAIRED FASTING GLUCOSE): ICD-10-CM

## 2019-11-20 DIAGNOSIS — E66.01 MORBID OBESITY WITH BMI OF 45.0-49.9, ADULT (HCC): ICD-10-CM

## 2019-11-20 DIAGNOSIS — E78.2 MIXED HYPERLIPIDEMIA: ICD-10-CM

## 2019-11-20 DIAGNOSIS — E55.9 VITAMIN D DEFICIENCY: ICD-10-CM

## 2019-11-20 RX ORDER — HYDROCODONE BITARTRATE AND ACETAMINOPHEN 5; 325 MG/1; MG/1
TABLET ORAL
Refills: 0 | COMMUNITY
Start: 2019-08-20 | End: 2020-11-24

## 2019-11-20 RX ORDER — SULFAMETHOXAZOLE AND TRIMETHOPRIM 800; 160 MG/1; MG/1
1 TABLET ORAL 2 TIMES DAILY
Qty: 14 TAB | Refills: 0 | Status: SHIPPED | OUTPATIENT
Start: 2019-11-20 | End: 2019-11-27

## 2019-11-20 RX ORDER — ALBUTEROL SULFATE 90 UG/1
1 AEROSOL, METERED RESPIRATORY (INHALATION)
Qty: 1 INHALER | Refills: 1 | Status: SHIPPED | OUTPATIENT
Start: 2019-11-20 | End: 2020-11-24 | Stop reason: SDUPTHER

## 2019-11-20 NOTE — PROGRESS NOTES
HISTORY OF PRESENT ILLNESS  Karen Jay is a 29 y.o. female. HPI   Last here 9/12/18 Pt is here for acute/routine care.      BP today is 126/79      Wt today is 290 lbs   Pt states that she has probably gained weight due to eating out/not having time to make meals at home  Pt has tried going to the gym but has a hard time committing   Discussed diet and w/l ww       Reviewed last labs 9/18   Will get labs today      Pt takes zyrtec daily     Prev  ordered vit D OTC 50K units weekly and advised OTC after this  However pt is not taking any vit D OTC  Advised to take Vit d 2000 units/day    She states that she takes ibuprofen for her tooth pain   Has one more hydrocodone left     Pt complains of a lesion on her left cheek  Told it is most likely a pimple-like lesion that has been aggravated due to contact with bacteria  Gave Bactrim and advised her not to scratch her face so it does not come back        Recall pt used to take celexa and buspar for anxiety/depression      PREVENTIVE:  Colonoscopy: fmhx colon cancer - maternal aunt (~age 40) and a first cousin once removed age 42's, referred, reminded, referred again  Pap: YECENIA Oleary, 12/18, scheduled   Mammogram: not yet needed, denies fmhx  Tdap: 5/06/2013  Flu shot: declines   A1c: 10/15 5.9, 2/17 5.9, 6/17 5.7, 2/18 5.9 9/18 5.9   Eye exam: Dr. Bambi Martínez 11/18, scheduled  Lipids: 9/18     Patient Active Problem List    Diagnosis Date Noted    Morbid obesity with BMI of 45.0-49.9, adult ([facility] [address] 12/09/2016    Adjustment reaction with anxiety 12/21/2015     Current Outpatient Medications   Medication Sig Dispense Refill    ergocalciferol (ERGOCALCIFEROL) 50,000 unit capsule Take 1 Cap by mouth every seven (7) days. 8 Cap 0    cetirizine (ZYRTEC) 10 mg tablet Take 1 Tab by mouth nightly. 90 Tab 1    ibuprofen (MOTRIN) 800 mg tablet Take 1 Tab by mouth every eight (8) hours as needed for Pain.  40 Tab 0    albuterol (PROAIR HFA) 90 mcg/actuation inhaler Take  by inhalation. Past Surgical History:   Procedure Laterality Date    HX BREAST BIOPSY  6/2012    LEFT breast excisional biopsy    HX HEENT      orbital fracture     HX ORTHOPAEDIC      right knee      Lab Results   Component Value Date/Time    WBC 9.9 09/12/2018 12:29 PM    HGB 12.5 09/12/2018 12:29 PM    HCT 39.4 09/12/2018 12:29 PM    PLATELET 161 (H) 50/04/4422 12:29 PM    MCV 89 09/12/2018 12:29 PM     Lab Results   Component Value Date/Time    Cholesterol, total 215 (H) 09/12/2018 12:29 PM    HDL Cholesterol 47 09/12/2018 12:29 PM    LDL, calculated 132 (H) 09/12/2018 12:29 PM    Triglyceride 178 (H) 09/12/2018 12:29 PM     Lab Results   Component Value Date/Time    GFR est non- 09/12/2018 12:29 PM    GFR est  09/12/2018 12:29 PM    Creatinine 0.67 09/12/2018 12:29 PM    BUN 8 09/12/2018 12:29 PM    Sodium 139 09/12/2018 12:29 PM    Potassium 4.4 09/12/2018 12:29 PM    Chloride 102 09/12/2018 12:29 PM    CO2 22 09/12/2018 12:29 PM        Review of Systems   Respiratory: Negative for shortness of breath. Cardiovascular: Negative for chest pain. Physical Exam  Constitutional:       General: She is not in acute distress. Appearance: She is well-developed. She is not diaphoretic. HENT:      Head: Normocephalic and atraumatic. Right Ear: Tympanic membrane, ear canal and external ear normal.      Left Ear: Tympanic membrane, ear canal and external ear normal.      Mouth/Throat:      Mouth: Mucous membranes are moist.      Pharynx: Oropharynx is clear. No oropharyngeal exudate or posterior oropharyngeal erythema. Eyes:      General: No scleral icterus. Right eye: No discharge. Left eye: No discharge. Conjunctiva/sclera: Conjunctivae normal.      Pupils: Pupils are equal, round, and reactive to light. Neck:      Musculoskeletal: Normal range of motion and neck supple. Vascular: No carotid bruit.    Cardiovascular:      Rate and Rhythm: Normal rate and regular rhythm. Heart sounds: Normal heart sounds. No murmur. No friction rub. No gallop. Pulmonary:      Effort: Pulmonary effort is normal. No respiratory distress. Breath sounds: Normal breath sounds. No wheezing or rales. Chest:      Chest wall: No tenderness. Abdominal:      General: There is no distension. Palpations: Abdomen is soft. There is no mass. Tenderness: There is no tenderness. There is no guarding or rebound. Hernia: No hernia is present. Musculoskeletal: Normal range of motion. General: No tenderness or deformity. Lymphadenopathy:      Cervical: No cervical adenopathy. Skin:     General: Skin is warm and dry. Coloration: Skin is not pale. Findings: No erythema or rash. Comments: 3 mm papule with open center    Neurological:      Mental Status: She is alert and oriented to person, place, and time. Coordination: Coordination normal.   Psychiatric:         Behavior: Behavior normal.         ASSESSMENT and PLAN    ICD-10-CM ICD-9-CM    1. Physical exam, annual                  Discussed diet wt loss and ww, declines flu shot, tdap utd eye exam due pelvic scheduled colo due to multiple fmhx pt is aware and reprinted referral labs ordered  Z00.00 V70.0 LIPID PANEL      METABOLIC PANEL, COMPREHENSIVE      CBC W/O DIFF      TSH 3RD GENERATION      HEMOGLOBIN A1C WITH EAG   2. Morbid obesity with BMI of 45.0-49.9, adult ([facility] [address]              Has continued to gain wt discussed importance of wt loss and ww  E66.01 278.01 LIPID PANEL    L00.69 O21.54 METABOLIC PANEL, COMPREHENSIVE      CBC W/O DIFF      TSH 3RD GENERATION      HEMOGLOBIN A1C WITH EAG   3. IFG (impaired fasting glucose)                Check a1c  R73.01 790.21    4. Mixed hyperlipidemia                  Check lipids diet controlled  E78.2 272.2    5. Boil            Will give bactrim bid for 7 days  L02.92 680.9    6.  Vitamin D deficiency                Not taking any d otc will check level and tx as needed  E55.9 268.9 VITAMIN D, 25 HYDROXY        Depression screen reviewed and negative. Scribed by Rubio Escamilla of [address] [place] [address] [address], as dictated by Dr. Gilbert Jaime. Current diagnosis and concerns discussed with pt at length. Pt understands risks and benefits or current treatment plan and medications, and accepts the treatment and medication with any possible risks. Pt asks appropriate questions, which were answered. Pt was instructed to call with any concerns or problems. I have reviewed the note documented by the scribe. The services provided are my own.   The documentation is accurate

## 2019-11-21 LAB
25(OH)D3+25(OH)D2 SERPL-MCNC: 15.2 NG/ML (ref 30–100)
ALBUMIN SERPL-MCNC: 4.3 G/DL (ref 3.5–5.5)
ALBUMIN/GLOB SERPL: 2 {RATIO} (ref 1.2–2.2)
ALP SERPL-CCNC: 71 IU/L (ref 39–117)
ALT SERPL-CCNC: 12 IU/L (ref 0–32)
AST SERPL-CCNC: 8 IU/L (ref 0–40)
BILIRUB SERPL-MCNC: 0.3 MG/DL (ref 0–1.2)
BUN SERPL-MCNC: 10 MG/DL (ref 6–20)
BUN/CREAT SERPL: 16 (ref 9–23)
CALCIUM SERPL-MCNC: 9.5 MG/DL (ref 8.7–10.2)
CHLORIDE SERPL-SCNC: 101 MMOL/L (ref 96–106)
CHOLEST SERPL-MCNC: 224 MG/DL (ref 100–199)
CO2 SERPL-SCNC: 21 MMOL/L (ref 20–29)
CREAT SERPL-MCNC: 0.61 MG/DL (ref 0.57–1)
ERYTHROCYTE [DISTWIDTH] IN BLOOD BY AUTOMATED COUNT: 13.8 % (ref 12.3–15.4)
EST. AVERAGE GLUCOSE BLD GHB EST-MCNC: 123 MG/DL
GLOBULIN SER CALC-MCNC: 2.2 G/DL (ref 1.5–4.5)
GLUCOSE SERPL-MCNC: 91 MG/DL (ref 65–99)
HBA1C MFR BLD: 5.9 % (ref 4.8–5.6)
HCT VFR BLD AUTO: 37.5 % (ref 34–46.6)
HDLC SERPL-MCNC: 49 MG/DL
HGB BLD-MCNC: 12.2 G/DL (ref 11.1–15.9)
LDLC SERPL CALC-MCNC: 157 MG/DL (ref 0–99)
MCH RBC QN AUTO: 28 PG (ref 26.6–33)
MCHC RBC AUTO-ENTMCNC: 32.5 G/DL (ref 31.5–35.7)
MCV RBC AUTO: 86 FL (ref 79–97)
PLATELET # BLD AUTO: 459 X10E3/UL (ref 150–450)
POTASSIUM SERPL-SCNC: 4.3 MMOL/L (ref 3.5–5.2)
PROT SERPL-MCNC: 6.5 G/DL (ref 6–8.5)
RBC # BLD AUTO: 4.36 X10E6/UL (ref 3.77–5.28)
SODIUM SERPL-SCNC: 135 MMOL/L (ref 134–144)
TRIGL SERPL-MCNC: 88 MG/DL (ref 0–149)
TSH SERPL DL<=0.005 MIU/L-ACNC: 0.57 UIU/ML (ref 0.45–4.5)
VLDLC SERPL CALC-MCNC: 18 MG/DL (ref 5–40)
WBC # BLD AUTO: 9.1 X10E3/UL (ref 3.4–10.8)

## 2019-11-21 NOTE — PROGRESS NOTES
vit D is low--the patient needs 50,000 units weekly for 16 weeks then start a daily 2000unit supplement instead.     Wt loss/diet for high lipids and prediabetes

## 2019-11-22 RX ORDER — ERGOCALCIFEROL 1.25 MG/1
50000 CAPSULE ORAL
Qty: 16 CAP | Refills: 0 | Status: SHIPPED | OUTPATIENT
Start: 2019-11-22 | End: 2020-11-24

## 2019-11-22 NOTE — PROGRESS NOTES
Called, spoke to pt. Two pt identifiers confirmed. Pt informed per Dr. Giovanny Khoury vit D is low--the patient needs 50,000 units weekly for 16 weeks then start a daily 2000unit supplement instead--pended. Pt informed per Dr. Giovanny Khoury wt loss/diet for high lipids and prediabetes. Pt verbalized understanding of information discussed w/ no further questions at this time.

## 2020-11-20 NOTE — PROGRESS NOTES
HISTORY OF PRESENT ILLNESS  Dipti Reno is a 28 y.o. female.   HPI     Last here 11/20/19 Pt is here for routine care.      She c/o spot on L leg x several years  She will pick at it and it will come back   Discussed looks like ingrown hair or blackhead  Discussed derm could take it out - will provide referral     She c/o HA x several weeks  It will start by tension in her neck and then will come and linger behind her eyes  Discussed taking allergy medicine every night and flonase every am   Discussed not straining neck     BP today is  125/88  Has not been monitoring bp at home     Wt is 257 lbs - down 33 lbs x lov   Congratulated her on this   She has been walking, not eating late at night, more water intake   Discussed diet and w/l ww       Reviewed last labs   Will get labs today     Pt takes zyrtec daily for allergies  - has been out of this   Needs replacement albuterol for asthma      low vit d on last labs, she has not been taking vit d, will order weekly vit d     Lov, Pt complains of a lesion on her left cheek, gave bactrim     She has been feeling more down over the past couple months, but she feels as if she is on the upswing and does not need meds  Recall pt used to take celexa and buspar for anxiety/depression      PREVENTIVE:  Colonoscopy: fmhx colon cancer - maternal aunt (~age 44) and a first cousin once removed age 40's, referred, reminded, referred again  Pap: YECENIA Oleary, 12/19, scheduled 12/16/20  Mammogram: not yet needed, denies fmhx  Tdap: 5/06/2013  Flu shot: declines   A1c: 10/15 5.9, 2/17 5.9, 6/17 5.7, 2/18 5.9 9/18 5.9 11/19 5.9  Eye exam: Dr. Maricruz Schneider 11/19, due reminded   Lipids: 11/19     Patient Active Problem List    Diagnosis Date Noted    Morbid obesity with BMI of 45.0-49.9, adult ([facility] [address] 12/09/2016    Adjustment reaction with anxiety 12/21/2015     Current Outpatient Medications   Medication Sig Dispense Refill    albuterol (ProAir HFA) 90 mcg/actuation inhaler Take 1 Puff by inhalation every six (6) hours as needed for Wheezing. 1 Inhaler 1    cetirizine (ZYRTEC) 10 mg tablet Take 1 Tab by mouth nightly. 90 Tab 1    ergocalciferol (ERGOCALCIFEROL) 1,250 mcg (50,000 unit) capsule Take 1 Cap by mouth every seven (7) days. 12 Cap 1    fluticasone propionate (FLONASE) 50 mcg/actuation nasal spray 2 Sprays by Both Nostrils route daily. 1 Bottle 1    fexofenadine HCl (ALLEGRA ALLERGY PO) Take  by mouth.  ibuprofen (MOTRIN) 800 mg tablet Take 1 Tab by mouth every eight (8) hours as needed for Pain. 40 Tab 0     Past Surgical History:   Procedure Laterality Date    HX BREAST BIOPSY  6/2012    LEFT breast excisional biopsy    HX HEENT      orbital fracture     HX ORTHOPAEDIC      right knee    HX OTHER SURGICAL Left 11/2019    cut the cap off the wisdom tooth--left the roots. Lab Results   Component Value Date/Time    WBC 9.1 11/20/2019 09:47 AM    HGB 12.2 11/20/2019 09:47 AM    HCT 37.5 11/20/2019 09:47 AM    PLATELET 785 (H) 52/94/5667 09:47 AM    MCV 86 11/20/2019 09:47 AM     Lab Results   Component Value Date/Time    Cholesterol, total 224 (H) 11/20/2019 09:47 AM    HDL Cholesterol 49 11/20/2019 09:47 AM    LDL, calculated 157 (H) 11/20/2019 09:47 AM    Triglyceride 88 11/20/2019 09:47 AM     Lab Results   Component Value Date/Time    GFR est non- 11/20/2019 09:47 AM    GFR est  11/20/2019 09:47 AM    Creatinine 0.61 11/20/2019 09:47 AM    BUN 10 11/20/2019 09:47 AM    Sodium 135 11/20/2019 09:47 AM    Potassium 4.3 11/20/2019 09:47 AM    Chloride 101 11/20/2019 09:47 AM    CO2 21 11/20/2019 09:47 AM        Review of Systems   Respiratory: Negative for shortness of breath. Cardiovascular: Negative for chest pain. Musculoskeletal: Positive for neck pain. Neurological: Positive for headaches. Physical Exam  Constitutional:       General: She is not in acute distress. Appearance: Normal appearance.  She is not ill-appearing, toxic-appearing or diaphoretic. HENT:      Head: Normocephalic and atraumatic. Right Ear: External ear normal.      Left Ear: External ear normal.   Eyes:      General:         Right eye: No discharge. Left eye: No discharge. Conjunctiva/sclera: Conjunctivae normal.      Pupils: Pupils are equal, round, and reactive to light. Neck:      Musculoskeletal: Normal range of motion and neck supple. Vascular: No carotid bruit. Cardiovascular:      Rate and Rhythm: Normal rate and regular rhythm. Pulses: Normal pulses. Heart sounds: Normal heart sounds. No murmur. No friction rub. No gallop. Pulmonary:      Effort: No respiratory distress. Breath sounds: Normal breath sounds. No wheezing or rales. Chest:      Chest wall: No tenderness. Abdominal:      General: Abdomen is flat. There is no distension. Palpations: Abdomen is soft. There is no mass. Tenderness: There is no abdominal tenderness. There is no guarding or rebound. Hernia: No hernia is present. Musculoskeletal: Normal range of motion. Right lower leg: No edema. Left lower leg: No edema. Skin:     General: Skin is warm and dry. Neurological:      Mental Status: She is alert and oriented to person, place, and time. Mental status is at baseline. Coordination: Coordination normal.      Gait: Gait normal.   Psychiatric:         Mood and Affect: Mood normal.         Behavior: Behavior normal.         ASSESSMENT and PLAN    ICD-10-CM ICD-9-CM    1.  Physical exam       Weight improved since last office visit continue to work on portions doing a great job    Declines flu shot    Eye exam was done last year and scheduled for this year pelvic exam was completed last year schedule for this year placed referral for colon cancer screening previously but she never completed this place referral again today    Labs today    Tdap up-to-date     Z00.00 V70.9 LIPID PANEL METABOLIC PANEL, COMPREHENSIVE      CBC W/O DIFF      HEMOGLOBIN A1C WITH EAG      TSH 3RD GENERATION      VITAMIN D, 25 HYDROXY      REFERRAL TO DERMATOLOGY   2. Morbid obesity with BMI of 45.0-49.9, adult (HCC)  E66.01 278.01 LIPID PANEL   Work on portions doing a great job down 30 pounds N43.12 B26.15 METABOLIC PANEL, COMPREHENSIVE      CBC W/O DIFF      HEMOGLOBIN A1C WITH EAG      TSH 3RD GENERATION      VITAMIN D, 25 HYDROXY   3. Vitamin D deficiency  E55.9 268.9 LIPID PANEL      METABOLIC PANEL, COMPREHENSIVE   Not taking any vitamin D again was very low last time completed her 50 K units weekly will go ahead and reorder 50 K units weekly of vitamin D to get her levels up   CBC W/O DIFF      HEMOGLOBIN A1C WITH EAG      TSH 3RD GENERATION      VITAMIN D, 25 HYDROXY   4. Mixed hyperlipidemia  E78.2 272.2 LIPID PANEL      METABOLIC PANEL, COMPREHENSIVE   Diet controlled   CBC W/O DIFF      HEMOGLOBIN A1C WITH EAG      TSH 3RD GENERATION      VITAMIN D, 25 HYDROXY   5. IFG (impaired fasting glucose)  R73.01 790.21 LIPID PANEL      METABOLIC PANEL, COMPREHENSIVE   Diet controlled check A1c   CBC W/O DIFF      HEMOGLOBIN A1C WITH EAG      TSH 3RD GENERATION      VITAMIN D, 25 HYDROXY      Depression screen reviewed and positive (1), but she feels as if she is on upward climb and does not need meds     Scribed by Elder [facility], as dictated by Dr. Luanne Montoya. Current diagnosis and concerns discussed with pt at length. Pt understands risks and benefits or current treatment plan and medications, and accepts the treatment and medication with any possible risks. Pt asks appropriate questions, which were answered. Pt was instructed to call with any concerns or problems. I have reviewed the note documented by the scribe. The services provided are my own.   The documentation is accurate

## 2020-11-24 ENCOUNTER — OFFICE VISIT (OUTPATIENT)
Dept: INTERNAL MEDICINE CLINIC | Age: 35
End: 2020-11-24
Payer: COMMERCIAL

## 2020-11-24 VITALS
DIASTOLIC BLOOD PRESSURE: 88 MMHG | OXYGEN SATURATION: 99 % | RESPIRATION RATE: 18 BRPM | BODY MASS INDEX: 42.77 KG/M2 | TEMPERATURE: 97.1 F | WEIGHT: 257 LBS | HEART RATE: 81 BPM | SYSTOLIC BLOOD PRESSURE: 125 MMHG

## 2020-11-24 DIAGNOSIS — R73.01 IFG (IMPAIRED FASTING GLUCOSE): ICD-10-CM

## 2020-11-24 DIAGNOSIS — E66.01 MORBID OBESITY WITH BMI OF 45.0-49.9, ADULT (HCC): ICD-10-CM

## 2020-11-24 DIAGNOSIS — Z00.00 PHYSICAL EXAM: Primary | ICD-10-CM

## 2020-11-24 DIAGNOSIS — E55.9 VITAMIN D DEFICIENCY: ICD-10-CM

## 2020-11-24 DIAGNOSIS — E78.2 MIXED HYPERLIPIDEMIA: ICD-10-CM

## 2020-11-24 PROCEDURE — 99395 PREV VISIT EST AGE 18-39: CPT | Performed by: INTERNAL MEDICINE

## 2020-11-24 RX ORDER — FLUTICASONE PROPIONATE 50 MCG
2 SPRAY, SUSPENSION (ML) NASAL DAILY
Qty: 1 BOTTLE | Refills: 1 | Status: SHIPPED | OUTPATIENT
Start: 2020-11-24

## 2020-11-24 RX ORDER — ALBUTEROL SULFATE 90 UG/1
1 AEROSOL, METERED RESPIRATORY (INHALATION)
Qty: 1 INHALER | Refills: 1 | Status: SHIPPED | OUTPATIENT
Start: 2020-11-24

## 2020-11-24 RX ORDER — ERGOCALCIFEROL 1.25 MG/1
50000 CAPSULE ORAL
Qty: 12 CAP | Refills: 1 | Status: SHIPPED | OUTPATIENT
Start: 2020-11-24 | End: 2021-12-13

## 2020-11-24 RX ORDER — CETIRIZINE HCL 10 MG
10 TABLET ORAL
Qty: 90 TAB | Refills: 1 | Status: SHIPPED | OUTPATIENT
Start: 2020-11-24

## 2021-11-29 ENCOUNTER — TELEPHONE (OUTPATIENT)
Dept: INTERNAL MEDICINE CLINIC | Age: 36
End: 2021-11-29

## 2021-11-29 NOTE — TELEPHONE ENCOUNTER
----- Message from Chavo Spring sent at 11/29/2021  7:57 AM EST -----  Subject: Appointment Request    Reason for Call: Routine Physical Exam    QUESTIONS  Type of Appointment? Established Patient  Reason for appointment request? Available appointments did not meet   patient need  Additional Information for Provider? Patient wanted to get physical   reschedule for sometime before end of the year. Is willing to see another   provider and also wanted to address bottom of left foot that has a   possible callus on it.  ---------------------------------------------------------------------------  --------------  CALL BACK INFO  What is the best way for the office to contact you? OK to leave message on   voicemail  Preferred Call Back Phone Number? [phone]  ---------------------------------------------------------------------------  --------------  SCRIPT ANSWERS  Relationship to Patient? Self  Have your symptoms changed? No  (Is the patient requesting their annual physical and does not need PAP or   AWV per above?)? Yes   Have you been diagnosed with, awaiting test results for, or told that you   are suspected of having COVID-19 (Coronavirus)? (If patient has tested   negative or was tested as a requirement for work, school, or travel and   not based on symptoms, answer no)? No  Within the past two weeks have you developed any of the following symptoms   (answer no if symptoms have been present longer than 2 weeks or began   more than 2 weeks ago)? Fever or Chills, Cough, Shortness of breath or   difficulty breathing, Loss of taste or smell, Sore throat, Nasal   congestion, Sneezing or runny nose, Fatigue or generalized body aches   (answer no if pain is specific to a body part e.g. back pain), Diarrhea,   Headache? No  Have you had close contact with someone with COVID-19 in the last 14 days? No  (Service Expert  click yes below to proceed with [employer] As Usual   Scheduling)?  Yes

## 2021-12-08 NOTE — PROGRESS NOTES
HISTORY OF PRESENT ILLNESS  Logan Green is a 39 y.o. female. HPI     Last here 11/24/21. Pt is here for routine care. She c/o gout R foot  She thinks that this flares up after eating seafood  Will check uric acid level    She c/o callus on L foot  On exam plantar wart, will give referral to podiatry  Discussed using OTC wart patches    Discussed covid vaccine     BP today is 117/79     Wt today is 270 lbs   Discussed diet and continued wt/l  She plans on seeing nutritionist      Reviewed last labs      Pt takes zyrtec daily for allergies  - has been out of this   Needs replacement albuterol for asthma      low vit d on last labs, she has not been taking vit d, will order weekly vit d     Lov, Pt complains of a lesion on her left cheek, gave bactrim     She has been feeling more down over the past couple months, but she feels as if she is on the upswing and does not need meds  Recall pt used to take celexa and buspar for anxiety/depression      PREVENTIVE:  Colonoscopy: fmhx colon cancer - maternal aunt (~age 40) and a first cousin once removed age 40's, referred, reminded, referred again  Pap: YECENIA Oleary, 12/16/20  Mammogram: not yet needed, denies fmhx  Tdap: 5/06/2013  Flu shot: declines   A1c: 10/15 5.9, 2/17 5.9, 6/17 5.7, 2/18 5.9 9/18 5.9 11/19 5.9 12/20 5.7  Eye exam: Dr. Malachi Whitfield saw black spot, this was stable on f/u  Lipids: 11/19   Covid vaccine: declines    Patient Active Problem List    Diagnosis Date Noted    Morbid obesity with BMI of 45.0-49.9, adult ([facility] [address] 12/09/2016    Adjustment reaction with anxiety 12/21/2015     Current Outpatient Medications   Medication Sig Dispense Refill    albuterol (ProAir HFA) 90 mcg/actuation inhaler Take 1 Puff by inhalation every six (6) hours as needed for Wheezing. 1 Inhaler 1    cetirizine (ZYRTEC) 10 mg tablet Take 1 Tab by mouth nightly.  90 Tab 1    fluticasone propionate (FLONASE) 50 mcg/actuation nasal spray 2 Sprays by Both Nostrils route daily. 1 Bottle 1    ibuprofen (MOTRIN) 800 mg tablet Take 1 Tab by mouth every eight (8) hours as needed for Pain. 40 Tab 0     Past Surgical History:   Procedure Laterality Date    HX BREAST BIOPSY  6/2012    LEFT breast excisional biopsy    HX HEENT      orbital fracture     HX ORTHOPAEDIC      right knee    HX OTHER SURGICAL Left 11/2019    cut the cap off the wisdom tooth--left the roots. Lab Results   Component Value Date/Time    WBC 9.1 11/20/2019 09:47 AM    HGB 12.2 11/20/2019 09:47 AM    HCT 37.5 11/20/2019 09:47 AM    PLATELET 302 (H) 94/69/7725 09:47 AM    MCV 86 11/20/2019 09:47 AM     Lab Results   Component Value Date/Time    Cholesterol, total 224 (H) 11/20/2019 09:47 AM    HDL Cholesterol 49 11/20/2019 09:47 AM    LDL, calculated 157 (H) 11/20/2019 09:47 AM    Triglyceride 88 11/20/2019 09:47 AM     Lab Results   Component Value Date/Time    GFR est non- 11/20/2019 09:47 AM    GFR est  11/20/2019 09:47 AM    Creatinine 0.61 11/20/2019 09:47 AM    BUN 10 11/20/2019 09:47 AM    Sodium 135 11/20/2019 09:47 AM    Potassium 4.3 11/20/2019 09:47 AM    Chloride 101 11/20/2019 09:47 AM    CO2 21 11/20/2019 09:47 AM        Review of Systems   Respiratory: Negative for shortness of breath. Cardiovascular: Negative for chest pain. Physical Exam  Constitutional:       General: She is not in acute distress. Appearance: Normal appearance. She is not ill-appearing, toxic-appearing or diaphoretic. HENT:      Head: Normocephalic and atraumatic. Right Ear: External ear normal.      Left Ear: External ear normal.   Eyes:      General:         Right eye: No discharge. Left eye: No discharge. Conjunctiva/sclera: Conjunctivae normal.      Pupils: Pupils are equal, round, and reactive to light. Cardiovascular:      Rate and Rhythm: Normal rate and regular rhythm. Heart sounds: No murmur heard. No friction rub. No gallop.     Pulmonary: Effort: No respiratory distress. Breath sounds: Normal breath sounds. No wheezing or rales. Chest:      Chest wall: No tenderness. Abdominal:      General: Abdomen is flat. There is no distension. Palpations: Abdomen is soft. There is no mass. Tenderness: There is no abdominal tenderness. There is no guarding or rebound. Hernia: No hernia is present. Musculoskeletal:         General: Normal range of motion. Cervical back: Normal range of motion and neck supple. Skin:     General: Skin is warm and dry. Comments: Plantar wart L foot   Neurological:      Mental Status: She is alert and oriented to person, place, and time. Mental status is at baseline. Coordination: Coordination normal.      Gait: Gait normal.   Psychiatric:         Mood and Affect: Mood normal.         Behavior: Behavior normal.         ASSESSMENT and PLAN    ICD-10-CM ICD-9-CM    1. Physical exam      Z00.00 V70.9 HEPATITIS C AB   Due for labs ordered discussed need for weight loss she plans on getting back on track and working with nutritionist declines flu shot and Covid vaccine    Overdue for colonoscopy ordered again    Due to see gynecologist again this year    Eye exam up-to-date   REFERRAL TO GASTROENTEROLOGY      LIPID PANEL      METABOLIC PANEL, COMPREHENSIVE      CBC W/O DIFF      TSH 3RD GENERATION      HEMOGLOBIN A1C WITH EAG   2. Obesity, Class III, BMI 40-49.9 (morbid obesity) ([facility] [address]       Work with nutritionist E66.01 278.01    3. Foot pain, right     Check uric acid level unlikely gout    Also with plantar wart refer to podiatry M79.586 729.5 URIC ACID        Depression screen reviewed and negative     Scribed by Rekha Mejia of [address] [place] [address] [address], as dictated by Dr. Abi Velasco. Current diagnosis and concerns discussed with pt at length. Pt understands risks and benefits or current treatment plan and medications, and accepts the treatment and medication with any possible risks.  Pt asks appropriate questions, which were answered. Pt was instructed to call with any concerns or problems. I have reviewed the note documented by the scribe. The services provided are my own.   The documentation is accurate

## 2021-12-13 ENCOUNTER — OFFICE VISIT (OUTPATIENT)
Dept: INTERNAL MEDICINE CLINIC | Age: 36
End: 2021-12-13
Payer: COMMERCIAL

## 2021-12-13 VITALS
DIASTOLIC BLOOD PRESSURE: 79 MMHG | RESPIRATION RATE: 16 BRPM | HEIGHT: 65 IN | SYSTOLIC BLOOD PRESSURE: 117 MMHG | HEART RATE: 76 BPM | TEMPERATURE: 97.9 F | OXYGEN SATURATION: 99 % | WEIGHT: 270 LBS | BODY MASS INDEX: 44.98 KG/M2

## 2021-12-13 DIAGNOSIS — E66.01 OBESITY, CLASS III, BMI 40-49.9 (MORBID OBESITY) (HCC): ICD-10-CM

## 2021-12-13 DIAGNOSIS — M79.671 FOOT PAIN, RIGHT: ICD-10-CM

## 2021-12-13 DIAGNOSIS — Z00.00 PHYSICAL EXAM: Primary | ICD-10-CM

## 2021-12-13 DIAGNOSIS — B07.0 PLANTAR WART, LEFT FOOT: ICD-10-CM

## 2021-12-13 LAB
ALBUMIN SERPL-MCNC: 3.8 G/DL (ref 3.5–5)
ALBUMIN/GLOB SERPL: 1.3 {RATIO} (ref 1.1–2.2)
ALP SERPL-CCNC: 76 U/L (ref 45–117)
ALT SERPL-CCNC: 20 U/L (ref 12–78)
ANION GAP SERPL CALC-SCNC: 6 MMOL/L (ref 5–15)
AST SERPL-CCNC: 12 U/L (ref 15–37)
BILIRUB SERPL-MCNC: 0.4 MG/DL (ref 0.2–1)
BUN SERPL-MCNC: 9 MG/DL (ref 6–20)
BUN/CREAT SERPL: 15 (ref 12–20)
CALCIUM SERPL-MCNC: 9.2 MG/DL (ref 8.5–10.1)
CHLORIDE SERPL-SCNC: 107 MMOL/L (ref 97–108)
CHOLEST SERPL-MCNC: 220 MG/DL
CO2 SERPL-SCNC: 25 MMOL/L (ref 21–32)
CREAT SERPL-MCNC: 0.59 MG/DL (ref 0.55–1.02)
ERYTHROCYTE [DISTWIDTH] IN BLOOD BY AUTOMATED COUNT: 14.9 % (ref 11.5–14.5)
EST. AVERAGE GLUCOSE BLD GHB EST-MCNC: 114 MG/DL
GLOBULIN SER CALC-MCNC: 3 G/DL (ref 2–4)
GLUCOSE SERPL-MCNC: 87 MG/DL (ref 65–100)
HBA1C MFR BLD: 5.6 % (ref 4–5.6)
HCT VFR BLD AUTO: 40.4 % (ref 35–47)
HCV AB SERPL QL IA: NONREACTIVE
HDLC SERPL-MCNC: 49 MG/DL
HDLC SERPL: 4.5 {RATIO} (ref 0–5)
HGB BLD-MCNC: 12.6 G/DL (ref 11.5–16)
LDLC SERPL CALC-MCNC: 139.8 MG/DL (ref 0–100)
MCH RBC QN AUTO: 28.5 PG (ref 26–34)
MCHC RBC AUTO-ENTMCNC: 31.2 G/DL (ref 30–36.5)
MCV RBC AUTO: 91.4 FL (ref 80–99)
NRBC # BLD: 0 K/UL (ref 0–0.01)
NRBC BLD-RTO: 0 PER 100 WBC
PLATELET # BLD AUTO: 397 K/UL (ref 150–400)
PMV BLD AUTO: 9.7 FL (ref 8.9–12.9)
POTASSIUM SERPL-SCNC: 4.5 MMOL/L (ref 3.5–5.1)
PROT SERPL-MCNC: 6.8 G/DL (ref 6.4–8.2)
RBC # BLD AUTO: 4.42 M/UL (ref 3.8–5.2)
SODIUM SERPL-SCNC: 138 MMOL/L (ref 136–145)
TRIGL SERPL-MCNC: 156 MG/DL (ref ?–150)
TSH SERPL DL<=0.05 MIU/L-ACNC: 0.88 UIU/ML (ref 0.36–3.74)
URATE SERPL-MCNC: 3.2 MG/DL (ref 2.6–6)
VLDLC SERPL CALC-MCNC: 31.2 MG/DL
WBC # BLD AUTO: 12.2 K/UL (ref 3.6–11)

## 2021-12-13 PROCEDURE — 99395 PREV VISIT EST AGE 18-39: CPT | Performed by: INTERNAL MEDICINE

## 2021-12-14 DIAGNOSIS — D72.829 LEUKOCYTOSIS, UNSPECIFIED TYPE: Primary | ICD-10-CM

## 2021-12-14 NOTE — PROGRESS NOTES
Pt called in. Two pt identifiers confirmed. Pt informed of lab results and recommendations per Dr. Anthony Campbell. Pt agreed to come back in 4-6 weeks to get lab redone. Pt verbalized understanding of information discussed w/ no further questions at this time.

## 2021-12-14 NOTE — PROGRESS NOTES
Please call patient back about results  Overall labs good  Mild lipid elevation --work on diet    Wbc elevated--can be seen with virus  Repeat cbc in 4-6 weeks

## 2022-09-01 ENCOUNTER — TELEPHONE (OUTPATIENT)
Dept: INTERNAL MEDICINE CLINIC | Age: 37
End: 2022-09-01

## 2022-09-01 NOTE — TELEPHONE ENCOUNTER
Pt states she has had ankle swelling since last Saturday and it is just getting worse. It is hard to walk and today she is barely walking. This is her right ankle. Pt wanted you to know she is going to urgent care. Thanks.

## 2022-09-26 ENCOUNTER — TELEPHONE (OUTPATIENT)
Dept: INTERNAL MEDICINE CLINIC | Age: 37
End: 2022-09-26

## 2022-09-26 NOTE — TELEPHONE ENCOUNTER
----- Message from Jarred Roberson sent at 9/26/2022 10:43 AM EDT -----  Subject: Appointment Request    Reason for Call: Established Patient Appointment needed: Routine Physical   Exam    QUESTIONS    Reason for appointment request? No appointments available during search     Additional Information for Provider? No appts available for a physical,   patient prefers morning or Tue/Wed at any time.    ---------------------------------------------------------------------------  --------------  Christa Zimmerman BXY  [phone]; OK to leave message on voicemail  ---------------------------------------------------------------------------  --------------  SCRIPT ANSWERS  COVID Screen: Courtney Elkins

## 2022-12-16 NOTE — PROGRESS NOTES
HISTORY OF PRESENT ILLNESS  Karen Daniels is a 40 y.o. female. HPI  Last here 12/13/21. Pt is here for routine care. Pt reports R ankle pain x 4 months. States this began in 8/22 with acute injury, she went to ED, was given ankle ankle support. Notes this has improved somewhat since then, but she still gets pain after walking/standing for long periods of time such that she limps. Gave home exercises and info for Dr. Rolm Meigs (ortho) if sx's do not improve. Reports her father has been dx'd w/ heart failure. Discussed there is no blood test to screen for this, encouraged continued wt/l, avoiding smoking. BP today is 135/70     Wt today is 244 lbs, down 26 lbs since lov, down 45 lbs over past 3 years   Congratulated pt on wt/l   She has been working on portions and exercise  Previously followed w/ [phone]       Reviewed labs   Ordered labs     Pt takes zyrtec daily for allergies  - has been out of this   Needs replacement albuterol for asthma          Recall pt used to take celexa and buspar for anxiety/depression  Reviewed + depression screen (1). States she has had a lot of loss and illness in her family. However, states she is doing okay, denies SI, is not interested in medication at this time. PREVENTIVE:  Colonoscopy: fmhx colon cancer - maternal aunt (~age 40) and a first cousin once removed age 42's, referred, reminded, referred again today !   Pap: YECENIA Oleary, 9/22   Mammogram: not yet needed, denies fmhx  Tdap: 5/06/2013  Flu shot: declines   Eye exam: Dr. Nate Evans 9/21 saw black spot, this was stable on f/U, will f/U 12/22  A1c:  11/19 5.9 12/20 5.7 12/21 5.6  Lipids: 12/21   Hep C: 12/21 neg  Covid vaccine: declines    Patient Active Problem List    Diagnosis Date Noted    Morbid obesity with BMI of 45.0-49.9, adult ([facility] [address] 12/09/2016    Adjustment reaction with anxiety 12/21/2015     Current Outpatient Medications   Medication Sig Dispense Refill    albuterol (ProAir HFA) 90 mcg/actuation inhaler Take 1 Puff by inhalation every six (6) hours as needed for Wheezing. 1 Inhaler 1    cetirizine (ZYRTEC) 10 mg tablet Take 1 Tab by mouth nightly. 90 Tab 1    fluticasone propionate (FLONASE) 50 mcg/actuation nasal spray 2 Sprays by Both Nostrils route daily. 1 Bottle 1    ibuprofen (MOTRIN) 800 mg tablet Take 1 Tab by mouth every eight (8) hours as needed for Pain. 40 Tab 0     Past Surgical History:   Procedure Laterality Date    HX BREAST BIOPSY  6/2012    LEFT breast excisional biopsy    HX HEENT      orbital fracture     HX ORTHOPAEDIC      right knee    HX OTHER SURGICAL Left 11/2019    cut the cap off the wisdom tooth--left the roots. Lab Results   Component Value Date/Time    WBC 12.2 (H) 12/13/2021 02:37 PM    HGB 12.6 12/13/2021 02:37 PM    HCT 40.4 12/13/2021 02:37 PM    PLATELET 632 30/82/4936 02:37 PM    MCV 91.4 12/13/2021 02:37 PM     Lab Results   Component Value Date/Time    Cholesterol, total 220 (H) 12/13/2021 02:37 PM    HDL Cholesterol 49 12/13/2021 02:37 PM    LDL, calculated 139.8 (H) 12/13/2021 02:37 PM    Triglyceride 156 (H) 12/13/2021 02:37 PM    CHOL/HDL Ratio 4.5 12/13/2021 02:37 PM     Lab Results   Component Value Date/Time    GFR est non-AA >60 12/13/2021 02:37 PM    GFR est AA >60 12/13/2021 02:37 PM    Creatinine 0.59 12/13/2021 02:37 PM    BUN 9 12/13/2021 02:37 PM    Sodium 138 12/13/2021 02:37 PM    Potassium 4.5 12/13/2021 02:37 PM    Chloride 107 12/13/2021 02:37 PM    CO2 25 12/13/2021 02:37 PM        Review of Systems   Respiratory:  Negative for shortness of breath. Cardiovascular:  Negative for chest pain. Musculoskeletal:  Positive for myalgias (R ankle pain). Physical Exam  Constitutional:       General: She is not in acute distress. Appearance: She is not ill-appearing, toxic-appearing or diaphoretic. HENT:      Head: Normocephalic and atraumatic.       Right Ear: External ear normal.      Left Ear: External ear normal. Eyes:      General:         Right eye: No discharge. Left eye: No discharge. Conjunctiva/sclera: Conjunctivae normal.      Pupils: Pupils are equal, round, and reactive to light. Cardiovascular:      Rate and Rhythm: Normal rate and regular rhythm. Heart sounds: No murmur heard. No friction rub. No gallop. Pulmonary:      Effort: No respiratory distress. Breath sounds: Normal breath sounds. No wheezing or rales. Chest:      Chest wall: No tenderness. Musculoskeletal:         General: Normal range of motion. Cervical back: Normal.   Skin:     General: Skin is warm and dry. Neurological:      Mental Status: She is oriented to person, place, and time. Mental status is at baseline. Coordination: Coordination normal.      Gait: Gait normal.   Psychiatric:         Mood and Affect: Mood normal.         Behavior: Behavior normal.       ASSESSMENT and PLAN    ICD-10-CM ICD-9-CM    1. Physical exam  Z00.00 V70.9 REFERRAL TO GASTROENTEROLOGY      LIPID PANEL   Has eye exam scheduled for later this week    Discussed diet and weight loss she is doing a great job weight is actually down close to 30 pounds from last year continue to work on this    Labs are due and ordered    Will be repeating CBC to follow-up on leukocytosis from last year    Declines flu shot declines COVID-vaccine Tdap will be due next year    Pelvic exam up-to-date    Mammogram not yet needed    Needs colon screening    Blood pressure fine   METABOLIC PANEL, COMPREHENSIVE      HEMOGLOBIN A1C WITH EAG      TSH 3RD GENERATION      CBC W/O DIFF      2. Obesity, Class III, BMI 40-49.9 (morbid obesity) ([facility])  E66.01 278.01 LIPID PANEL      METABOLIC PANEL, COMPREHENSIVE      HEMOGLOBIN A1C WITH EAG   Continue to work on portions improving   TSH 3RD GENERATION      CBC W/O DIFF      3.  Chronic pain of right ankle  M25.571 719.47 REFERRAL TO ORTHOPEDICS    G89.29 338.29    Provided exercises refer to Ortho if does not continue to improve   Scribed by Ozzy Leonardo of [address] [place] [address] [address], as dictated by Dr. Shayne Cary. Current diagnosis and concerns discussed with pt at length. Pt understands risks and benefits or current treatment plan and medications, and accepts the treatment and medication with any possible risks. Pt asks appropriate questions, which were answered. Pt was instructed to call with any concerns or problems. I have reviewed the note documented by the scribe. The services provided are my own. The documentation is accurate.

## 2022-12-20 ENCOUNTER — OFFICE VISIT (OUTPATIENT)
Dept: INTERNAL MEDICINE CLINIC | Age: 37
End: 2022-12-20
Payer: COMMERCIAL

## 2022-12-20 VITALS
WEIGHT: 244 LBS | HEIGHT: 65 IN | DIASTOLIC BLOOD PRESSURE: 70 MMHG | HEART RATE: 76 BPM | TEMPERATURE: 97.6 F | SYSTOLIC BLOOD PRESSURE: 135 MMHG | OXYGEN SATURATION: 99 % | RESPIRATION RATE: 16 BRPM | BODY MASS INDEX: 40.65 KG/M2

## 2022-12-20 DIAGNOSIS — G89.29 CHRONIC PAIN OF RIGHT ANKLE: ICD-10-CM

## 2022-12-20 DIAGNOSIS — Z00.00 PHYSICAL EXAM: Primary | ICD-10-CM

## 2022-12-20 DIAGNOSIS — E66.01 OBESITY, CLASS III, BMI 40-49.9 (MORBID OBESITY) (HCC): ICD-10-CM

## 2022-12-20 DIAGNOSIS — M25.571 CHRONIC PAIN OF RIGHT ANKLE: ICD-10-CM

## 2022-12-20 PROCEDURE — 99395 PREV VISIT EST AGE 18-39: CPT | Performed by: INTERNAL MEDICINE

## 2022-12-20 NOTE — PROGRESS NOTES
1. \"Have you been to the ER, urgent care clinic since your last visit? Hospitalized since your last visit? \" Yes [facility] 08/2022 for sprained ankle    2. \"Have you seen or consulted any other health care providers outside of the [address] [place] [address] since your last visit? \" Yes [facility]      3. For patients aged 39-70: Has the patient had a colonoscopy / FIT/ Cologuard? NA - based on age      If the patient is female:    4. For patients aged 41-77: Has the patient had a mammogram within the past 2 years? NA - based on age or sex      11. For patients aged 21-65: Has the patient had a pap smear? Yes - [facility] present.  Most recent result on file 09/2022

## 2022-12-21 LAB
ALBUMIN SERPL-MCNC: 3.8 G/DL (ref 3.5–5)
ALBUMIN/GLOB SERPL: 1.3 {RATIO} (ref 1.1–2.2)
ALP SERPL-CCNC: 84 U/L (ref 45–117)
ALT SERPL-CCNC: 17 U/L (ref 12–78)
ANION GAP SERPL CALC-SCNC: 2 MMOL/L (ref 5–15)
AST SERPL-CCNC: 12 U/L (ref 15–37)
BILIRUB SERPL-MCNC: 0.4 MG/DL (ref 0.2–1)
BUN SERPL-MCNC: 11 MG/DL (ref 6–20)
BUN/CREAT SERPL: 17 (ref 12–20)
CALCIUM SERPL-MCNC: 9.1 MG/DL (ref 8.5–10.1)
CHLORIDE SERPL-SCNC: 109 MMOL/L (ref 97–108)
CHOLEST SERPL-MCNC: 231 MG/DL
CO2 SERPL-SCNC: 28 MMOL/L (ref 21–32)
CREAT SERPL-MCNC: 0.66 MG/DL (ref 0.55–1.02)
ERYTHROCYTE [DISTWIDTH] IN BLOOD BY AUTOMATED COUNT: 15.2 % (ref 11.5–14.5)
EST. AVERAGE GLUCOSE BLD GHB EST-MCNC: 114 MG/DL
GLOBULIN SER CALC-MCNC: 3 G/DL (ref 2–4)
GLUCOSE SERPL-MCNC: 90 MG/DL (ref 65–100)
HBA1C MFR BLD: 5.6 % (ref 4–5.6)
HCT VFR BLD AUTO: 40.3 % (ref 35–47)
HDLC SERPL-MCNC: 62 MG/DL
HDLC SERPL: 3.7 {RATIO} (ref 0–5)
HGB BLD-MCNC: 12.6 G/DL (ref 11.5–16)
LDLC SERPL CALC-MCNC: 150.6 MG/DL (ref 0–100)
MCH RBC QN AUTO: 28.4 PG (ref 26–34)
MCHC RBC AUTO-ENTMCNC: 31.3 G/DL (ref 30–36.5)
MCV RBC AUTO: 91 FL (ref 80–99)
NRBC # BLD: 0 K/UL (ref 0–0.01)
NRBC BLD-RTO: 0 PER 100 WBC
PLATELET # BLD AUTO: 390 K/UL (ref 150–400)
PMV BLD AUTO: 9.7 FL (ref 8.9–12.9)
POTASSIUM SERPL-SCNC: 4.6 MMOL/L (ref 3.5–5.1)
PROT SERPL-MCNC: 6.8 G/DL (ref 6.4–8.2)
RBC # BLD AUTO: 4.43 M/UL (ref 3.8–5.2)
SODIUM SERPL-SCNC: 139 MMOL/L (ref 136–145)
TRIGL SERPL-MCNC: 92 MG/DL (ref ?–150)
TSH SERPL DL<=0.05 MIU/L-ACNC: 0.42 UIU/ML (ref 0.36–3.74)
VLDLC SERPL CALC-MCNC: 18.4 MG/DL
WBC # BLD AUTO: 9.8 K/UL (ref 3.6–11)

## 2024-01-30 ENCOUNTER — TELEMEDICINE (OUTPATIENT)
Age: 39
End: 2024-01-30
Payer: COMMERCIAL

## 2024-01-30 ENCOUNTER — TELEPHONE (OUTPATIENT)
Age: 39
End: 2024-01-30

## 2024-01-30 DIAGNOSIS — K52.82 EOSINOPHILIC COLITIS: ICD-10-CM

## 2024-01-30 DIAGNOSIS — J40 BRONCHITIS: Primary | ICD-10-CM

## 2024-01-30 DIAGNOSIS — F32.9 REACTIVE DEPRESSION: ICD-10-CM

## 2024-01-30 PROCEDURE — 99213 OFFICE O/P EST LOW 20 MIN: CPT | Performed by: INTERNAL MEDICINE

## 2024-01-30 RX ORDER — PREDNISONE 20 MG/1
TABLET ORAL
Qty: 12 TABLET | Refills: 0 | Status: SHIPPED | OUTPATIENT
Start: 2024-01-30

## 2024-01-30 RX ORDER — BENZONATATE 100 MG/1
100 CAPSULE ORAL 3 TIMES DAILY PRN
COMMUNITY

## 2024-01-30 SDOH — ECONOMIC STABILITY: FOOD INSECURITY: WITHIN THE PAST 12 MONTHS, THE FOOD YOU BOUGHT JUST DIDN'T LAST AND YOU DIDN'T HAVE MONEY TO GET MORE.: NEVER TRUE

## 2024-01-30 SDOH — ECONOMIC STABILITY: HOUSING INSECURITY
IN THE LAST 12 MONTHS, WAS THERE A TIME WHEN YOU DID NOT HAVE A STEADY PLACE TO SLEEP OR SLEPT IN A SHELTER (INCLUDING NOW)?: NO

## 2024-01-30 SDOH — ECONOMIC STABILITY: FOOD INSECURITY: WITHIN THE PAST 12 MONTHS, YOU WORRIED THAT YOUR FOOD WOULD RUN OUT BEFORE YOU GOT MONEY TO BUY MORE.: NEVER TRUE

## 2024-01-30 SDOH — ECONOMIC STABILITY: INCOME INSECURITY: HOW HARD IS IT FOR YOU TO PAY FOR THE VERY BASICS LIKE FOOD, HOUSING, MEDICAL CARE, AND HEATING?: NOT HARD AT ALL

## 2024-01-30 ASSESSMENT — PATIENT HEALTH QUESTIONNAIRE - PHQ9
1. LITTLE INTEREST OR PLEASURE IN DOING THINGS: 1
2. FEELING DOWN, DEPRESSED OR HOPELESS: 0
SUM OF ALL RESPONSES TO PHQ9 QUESTIONS 1 & 2: 1
SUM OF ALL RESPONSES TO PHQ QUESTIONS 1-9: 1

## 2024-01-30 ASSESSMENT — ENCOUNTER SYMPTOMS: SHORTNESS OF BREATH: 0

## 2024-01-30 NOTE — PROGRESS NOTES
\"Have you been to the ER, urgent care clinic since your last visit?  Hospitalized since your last visit?\"    YES - When: approximately 9 days ago.  Where and Why: [facility].    “Have you seen or consulted any other health care providers outside of [facility] since your last visit?”    YES - When: approximately 9 days ago.  Where and Why: [facility], Ear infection.        “Have you had a pap smear?”    NO        
distress.   Neurological:      Mental Status: She is alert and oriented to person, place, and time. Mental status is at baseline.   Psychiatric:         Mood and Affect: Mood normal.           ASSESSMENT and PLAN   Diagnosis Orders   1. Bronchitis     Already on antibiotics will add prednisone taper albuterol as needed chest x-ray ordered for if not improved may need to consider inhaled steroid like Flovent next   2. Reactive depression     Previously on medication discussed treatment such as SSRI currently not interested   3. Eosinophilic colitis     Follow GI has been referred to nutritionist previously having some difficulty with finances let  reach out   I have reviewed the note documented by the scribe.  The services provided are my own.  The documentation is accurate     Depression screen reviewed and positive.  See above related to social stressors will have  reach out  Scribed by Xavi Gipson of [facility], as dictated by Dr. Louise Contreras.    Current diagnosis and concerns discussed with pt at length. Pt understands risks and benefits or current treatment plan and medications, and accepts the treatment and medication with any possible risks. Pt asks appropriate questions, which were answered. Pt was instructed to call with any concerns or problems.    I have reviewed the note documented by the scribe. The services provided are my own. The documentation is accurate.  Edwina Hammond, was evaluated through a synchronous (real-time) audio-video encounter. The patient (or guardian if applicable) is aware that this is a billable service, which includes applicable co-pays. This Virtual Visit was conducted with patient's (and/or legal guardian's) consent. Patient identification was verified, and a caregiver was present when appropriate.   The patient was located at Home: [address] [place] [id number]  Provider was located at Facility (Appt Dept): [address] [place] [address]

## 2024-01-30 NOTE — TELEPHONE ENCOUNTER
Patient complains of possible sinusitis  Symptoms for how long 4 weeks, she went to [facility] in [place] and was given Benzonatate, Allegra and  Cefdinir    Other symptoms include achiness, congestion, cough described as with shortness of breath, waxing and waning over time, nasal congestion, no  fever, post nasal drip, productive cough with  brown colored sputum, sinus pressure, sneezing, sore throat, and wheezing    The patient wanted to schedule a physical; however, she is still not well. I explained to her, I would let  know about her concerns, she needs to be re-evaluated d/t she is not any better.    Patient verbalized understanding of information discussed w/ no further questions at this time.

## 2024-02-07 ENCOUNTER — OFFICE VISIT (OUTPATIENT)
Age: 39
End: 2024-02-07
Payer: COMMERCIAL

## 2024-02-07 ENCOUNTER — TELEPHONE (OUTPATIENT)
Age: 39
End: 2024-02-07

## 2024-02-07 VITALS
OXYGEN SATURATION: 100 % | TEMPERATURE: 98.4 F | HEIGHT: 65 IN | DIASTOLIC BLOOD PRESSURE: 56 MMHG | RESPIRATION RATE: 20 BRPM | BODY MASS INDEX: 45.98 KG/M2 | WEIGHT: 276 LBS | SYSTOLIC BLOOD PRESSURE: 110 MMHG | HEART RATE: 82 BPM

## 2024-02-07 DIAGNOSIS — G89.29 CHRONIC PAIN OF LEFT ANKLE: ICD-10-CM

## 2024-02-07 DIAGNOSIS — H66.91 OTITIS, RIGHT: ICD-10-CM

## 2024-02-07 DIAGNOSIS — Z00.00 PHYSICAL EXAM: ICD-10-CM

## 2024-02-07 DIAGNOSIS — E66.01 MORBID OBESITY WITH BMI OF 45.0-49.9, ADULT (HCC): ICD-10-CM

## 2024-02-07 DIAGNOSIS — R05.2 SUBACUTE COUGH: ICD-10-CM

## 2024-02-07 DIAGNOSIS — Z23 NEED FOR PROPHYLACTIC VACCINATION AGAINST DIPHTHERIA-TETANUS-PERTUSSIS (DTP): ICD-10-CM

## 2024-02-07 DIAGNOSIS — M25.572 CHRONIC PAIN OF LEFT ANKLE: ICD-10-CM

## 2024-02-07 DIAGNOSIS — Z00.00 PHYSICAL EXAM: Primary | ICD-10-CM

## 2024-02-07 LAB
ALBUMIN SERPL-MCNC: 3.7 G/DL (ref 3.5–5)
ALBUMIN/GLOB SERPL: 1.2 (ref 1.1–2.2)
ALP SERPL-CCNC: 76 U/L (ref 45–117)
ALT SERPL-CCNC: 19 U/L (ref 12–78)
ANION GAP SERPL CALC-SCNC: 1 MMOL/L (ref 5–15)
AST SERPL-CCNC: 13 U/L (ref 15–37)
BILIRUB SERPL-MCNC: 0.7 MG/DL (ref 0.2–1)
BUN SERPL-MCNC: 14 MG/DL (ref 6–20)
BUN/CREAT SERPL: 16 (ref 12–20)
CALCIUM SERPL-MCNC: 9.6 MG/DL (ref 8.5–10.1)
CHLORIDE SERPL-SCNC: 107 MMOL/L (ref 97–108)
CHOLEST SERPL-MCNC: 255 MG/DL
CO2 SERPL-SCNC: 30 MMOL/L (ref 21–32)
CREAT SERPL-MCNC: 0.86 MG/DL (ref 0.55–1.02)
ERYTHROCYTE [DISTWIDTH] IN BLOOD BY AUTOMATED COUNT: 14.6 % (ref 11.5–14.5)
GLOBULIN SER CALC-MCNC: 3.2 G/DL (ref 2–4)
GLUCOSE SERPL-MCNC: 102 MG/DL (ref 65–100)
HCT VFR BLD AUTO: 40.3 % (ref 35–47)
HDLC SERPL-MCNC: 67 MG/DL
HDLC SERPL: 3.8 (ref 0–5)
HGB BLD-MCNC: 12.9 G/DL (ref 11.5–16)
LDLC SERPL CALC-MCNC: 159.4 MG/DL (ref 0–100)
MCH RBC QN AUTO: 28.8 PG (ref 26–34)
MCHC RBC AUTO-ENTMCNC: 32 G/DL (ref 30–36.5)
MCV RBC AUTO: 90 FL (ref 80–99)
NRBC # BLD: 0 K/UL (ref 0–0.01)
NRBC BLD-RTO: 0 PER 100 WBC
PLATELET # BLD AUTO: 468 K/UL (ref 150–400)
PMV BLD AUTO: 9.7 FL (ref 8.9–12.9)
POTASSIUM SERPL-SCNC: 4.4 MMOL/L (ref 3.5–5.1)
PROT SERPL-MCNC: 6.9 G/DL (ref 6.4–8.2)
RBC # BLD AUTO: 4.48 M/UL (ref 3.8–5.2)
SODIUM SERPL-SCNC: 138 MMOL/L (ref 136–145)
TRIGL SERPL-MCNC: 143 MG/DL
TSH SERPL DL<=0.05 MIU/L-ACNC: 0.55 UIU/ML (ref 0.36–3.74)
VLDLC SERPL CALC-MCNC: 28.6 MG/DL
WBC # BLD AUTO: 11.5 K/UL (ref 3.6–11)

## 2024-02-07 PROCEDURE — 99213 OFFICE O/P EST LOW 20 MIN: CPT | Performed by: INTERNAL MEDICINE

## 2024-02-07 PROCEDURE — 90715 TDAP VACCINE 7 YRS/> IM: CPT | Performed by: INTERNAL MEDICINE

## 2024-02-07 PROCEDURE — 90471 IMMUNIZATION ADMIN: CPT | Performed by: INTERNAL MEDICINE

## 2024-02-07 PROCEDURE — 99395 PREV VISIT EST AGE 18-39: CPT | Performed by: INTERNAL MEDICINE

## 2024-02-07 RX ORDER — AMOXICILLIN AND CLAVULANATE POTASSIUM 875; 125 MG/1; MG/1
1 TABLET, FILM COATED ORAL 2 TIMES DAILY
Qty: 14 TABLET | Refills: 0 | Status: SHIPPED | OUTPATIENT
Start: 2024-02-07 | End: 2024-02-14

## 2024-02-07 ASSESSMENT — PATIENT HEALTH QUESTIONNAIRE - PHQ9
SUM OF ALL RESPONSES TO PHQ QUESTIONS 1-9: 1
2. FEELING DOWN, DEPRESSED OR HOPELESS: 1
1. LITTLE INTEREST OR PLEASURE IN DOING THINGS: 0
SUM OF ALL RESPONSES TO PHQ QUESTIONS 1-9: 1
10. IF YOU CHECKED OFF ANY PROBLEMS, HOW DIFFICULT HAVE THESE PROBLEMS MADE IT FOR YOU TO DO YOUR WORK, TAKE CARE OF THINGS AT HOME, OR GET ALONG WITH OTHER PEOPLE: 0
5. POOR APPETITE OR OVEREATING: 0
SUM OF ALL RESPONSES TO PHQ QUESTIONS 1-9: 1
6. FEELING BAD ABOUT YOURSELF - OR THAT YOU ARE A FAILURE OR HAVE LET YOURSELF OR YOUR FAMILY DOWN: 0
8. MOVING OR SPEAKING SO SLOWLY THAT OTHER PEOPLE COULD HAVE NOTICED. OR THE OPPOSITE, BEING SO FIGETY OR RESTLESS THAT YOU HAVE BEEN MOVING AROUND A LOT MORE THAN USUAL: 0
3. TROUBLE FALLING OR STAYING ASLEEP: 0
7. TROUBLE CONCENTRATING ON THINGS, SUCH AS READING THE NEWSPAPER OR WATCHING TELEVISION: 0
SUM OF ALL RESPONSES TO PHQ9 QUESTIONS 1 & 2: 1
9. THOUGHTS THAT YOU WOULD BE BETTER OFF DEAD, OR OF HURTING YOURSELF: 0
4. FEELING TIRED OR HAVING LITTLE ENERGY: 0
SUM OF ALL RESPONSES TO PHQ QUESTIONS 1-9: 1

## 2024-02-07 ASSESSMENT — ENCOUNTER SYMPTOMS: SHORTNESS OF BREATH: 0
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
## 2024-02-07 NOTE — PROGRESS NOTES
HISTORY OF PRESENT ILLNESS  Edwina Hammond is a 38 y.o. female.  HPI    Last here vv 1/30/24. She presents for acute care.    She reports hazy/blurry vision R eye she relates this to upper respiratory infection that she has been struggling with  Also notes puffiness around the eye normal on exam though scleral injection bilaterally  Of note there is a strong smell of marijuana in the room patient denies smoking  Of note she had a R ear infection last month  On exam: no swelling  Advised microwaveable eye mask  Advised her to see ophthalmology for visual disturbance    She also c/o L ankle pain x 3 months   She reports sharp pain with lying down or turning her foot  Wearing a brace, using ice and elevation  Ordered XR L foot   Referred to ortho     BP today is 110/56     Wt today is 276 lbs, up 32 lbs since lov  Discussed diet and wt/l use get back on track has been getting a fair amount of weight she reports dietary indiscretion she knows what to do she will start working on illuminating caloric beverages decreasing snacks decreasing eating out  Previously followed w/ [facility] wt/l clinic       Reviewed labs   Ordered labs     Lov she had persistent sinus sx  Gave her prednisone which helped, mostly resolved  States she still has some postnasal drip, scratchy throat, and congestion  Advised flonase   Discussed completing the XR chest which I ordered lov does have erythema in the right ear we will treat with Augmentin  Advised her to see ENT for persistent sx    Pt takes allegra daily for allergies    Has not been using her albuterol for asthma      Recall pt used to take celexa and buspar for anxiety/depression  She has been working with a   Reviewed depression screen (+1). States she is doing ok without medication. She tries to relieve stress on her own.       She had a colonoscopy with Dr Joseph 3/23,  Bx showed eosinophilic colitis per her report  She saw him for f/u - states she had a food allergy and 
\"Have you been to the ER, urgent care clinic since your last visit?  Hospitalized since your last visit?\"    NO    “Have you seen or consulted any other health care providers outside of [facility] since your last visit?”    NO        “Have you had a pap smear?”    NO        
supple. No tenderness.      Right lower leg: No edema.      Left lower leg: No edema.   Lymphadenopathy:      Cervical: No cervical adenopathy.   Skin:     General: Skin is warm and dry.      Findings: No erythema.   Neurological:      General: No focal deficit present.      Mental Status: She is alert and oriented to person, place, and time. Mental status is at baseline.      Gait: Gait normal.   Psychiatric:         Mood and Affect: Mood normal.           ASSESSMENT and PLAN   Diagnosis Orders   1. Physical exam  CBC    Comprehensive Metabolic Panel   Eye exam up-to-date that she will follow-up regarding visual complaints    Colonoscopy up-to-date repeat in 3 years    Pelvic exam is due she will schedule Tdap today    Declines flu shot    Labs ordered work on weight loss Hemoglobin A1C    Lipid Panel                                               I have reviewed the note documented by the scribe.  The services provided are my own.  The documentation is accurate     Depression screen reviewed and negative.  Scribed by Xavi Pulido [facility], as dictated by Dr. Louise Contreras.    Current diagnosis and concerns discussed with pt at length. Pt understands risks and benefits or current treatment plan and medications, and accepts the treatment and medication with any possible risks. Pt asks appropriate questions, which were answered. Pt was instructed to call with any concerns or problems.    I have reviewed the note documented by the scribe. The services provided are my own. The documentation is accurate.

## 2024-02-08 ENCOUNTER — TELEPHONE (OUTPATIENT)
Age: 39
End: 2024-02-08

## 2024-02-08 LAB
EST. AVERAGE GLUCOSE BLD GHB EST-MCNC: 120 MG/DL
HBA1C MFR BLD: 5.8 % (ref 4–5.6)

## 2024-02-08 NOTE — RESULT ENCOUNTER NOTE
Called pt, verified two pt identifiers.   Informed pt wbc elevation, related to uri, will repeat CBC in 2-3 months to ensure back to baseline.   Lipids up and prediabetes, work on wt loss and diet to improve, no medications for now. Work on avoiding simple carbs(\"whites, white bread, white rice, white pasta etc) will monitor w bloodwork.   Pt verifies understanding.

## 2024-02-08 NOTE — TELEPHONE ENCOUNTER
Pt would like a call back to go over her lab results.     She did get on my chart, but would like to discuss.  Please call.

## 2024-02-09 ENCOUNTER — HOSPITAL ENCOUNTER (OUTPATIENT)
Facility: HOSPITAL | Age: 39
Discharge: HOME | End: 2024-02-09
Payer: COMMERCIAL

## 2024-02-09 DIAGNOSIS — M25.572 CHRONIC PAIN OF LEFT ANKLE: ICD-10-CM

## 2024-02-09 DIAGNOSIS — J40 BRONCHITIS: ICD-10-CM

## 2024-02-09 DIAGNOSIS — G89.29 CHRONIC PAIN OF LEFT ANKLE: ICD-10-CM

## 2024-02-09 PROCEDURE — 71046 X-RAY EXAM CHEST 2 VIEWS: CPT

## 2024-02-09 PROCEDURE — 73610 X-RAY EXAM OF ANKLE: CPT

## 2024-03-27 ENCOUNTER — TELEPHONE (OUTPATIENT)
Age: 39
End: 2024-03-27

## 2024-03-27 DIAGNOSIS — D72.829 LEUKOCYTOSIS, UNSPECIFIED TYPE: Primary | ICD-10-CM

## 2024-03-27 NOTE — TELEPHONE ENCOUNTER
Called, Spoke with Pt  Received two pt identifiers  Per last labs, cbc is needed-ordered  Pt had appt today that was cancelled last min.  Pt did not need to be seen until 2025 per LOV  closing

## 2024-03-27 NOTE — TELEPHONE ENCOUNTER
----- Message from Angela Wen sent at 3/27/2024 10:15 AM EDT -----  Subject: Referral Request    Reason for referral request? Edwina Hammond called on 03/27 @ 10:14 am. She   is needing blood work orders activated to get her labs done. Will be   fasting.  Provider patient wants to be referred to(if known):     Provider Phone Number(if known):[phone]    Additional Information for Provider?   ---------------------------------------------------------------------------  --------------  CALL BACK INFO    [phone]; OK to leave message on voicemail  ---------------------------------------------------------------------------  --------------